# Patient Record
Sex: MALE | Race: WHITE | Employment: OTHER | URBAN - METROPOLITAN AREA
[De-identification: names, ages, dates, MRNs, and addresses within clinical notes are randomized per-mention and may not be internally consistent; named-entity substitution may affect disease eponyms.]

---

## 2017-11-02 ENCOUNTER — OFFICE VISIT (OUTPATIENT)
Dept: FAMILY MEDICINE CLINIC | Age: 67
End: 2017-11-02
Payer: COMMERCIAL

## 2017-11-02 VITALS
WEIGHT: 235 LBS | BODY MASS INDEX: 32.9 KG/M2 | SYSTOLIC BLOOD PRESSURE: 130 MMHG | TEMPERATURE: 97.2 F | RESPIRATION RATE: 18 BRPM | DIASTOLIC BLOOD PRESSURE: 82 MMHG | HEIGHT: 71 IN | HEART RATE: 72 BPM

## 2017-11-02 DIAGNOSIS — B96.89 ACUTE BACTERIAL SINUSITIS: Primary | ICD-10-CM

## 2017-11-02 DIAGNOSIS — J01.90 ACUTE BACTERIAL SINUSITIS: Primary | ICD-10-CM

## 2017-11-02 PROCEDURE — 99214 OFFICE O/P EST MOD 30 MIN: CPT | Performed by: NURSE PRACTITIONER

## 2017-11-02 RX ORDER — ATORVASTATIN CALCIUM 20 MG/1
20 TABLET, FILM COATED ORAL DAILY
Refills: 1 | COMMUNITY
Start: 2017-10-26

## 2017-11-02 RX ORDER — AMOXICILLIN AND CLAVULANATE POTASSIUM 875; 125 MG/1; MG/1
1 TABLET, FILM COATED ORAL 2 TIMES DAILY
Qty: 20 TABLET | Refills: 0 | Status: SHIPPED | OUTPATIENT
Start: 2017-11-02 | End: 2017-11-12

## 2017-11-02 RX ORDER — INFLUENZA A VIRUS A/MICHIGAN/45/2015 X-275 (H1N1) ANTIGEN (FORMALDEHYDE INACTIVATED), INFLUENZA A VIRUS A/SINGAPORE/INFIMH-16-0019/2016 IVR-186 (H3N2) ANTIGEN (FORMALDEHYDE INACTIVATED), AND INFLUENZA B VIRUS B/MARYLAND/15/2016 BX-69A (A B/COLORADO/6/2017-LIKE VIRUS) ANTIGEN (FORMALDEHYDE INACTIVATED) 60; 60; 60 UG/.5ML; UG/.5ML; UG/.5ML
INJECTION, SUSPENSION INTRAMUSCULAR
Refills: 0 | COMMUNITY
Start: 2017-10-10 | End: 2018-10-27

## 2017-11-02 ASSESSMENT — ENCOUNTER SYMPTOMS
EYE REDNESS: 0
CHEST TIGHTNESS: 0
COUGH: 1
EYE DISCHARGE: 0
VOICE CHANGE: 0
SHORTNESS OF BREATH: 0
WHEEZING: 0
SINUS PRESSURE: 0
SORE THROAT: 1

## 2017-11-02 NOTE — PATIENT INSTRUCTIONS

## 2017-11-02 NOTE — PROGRESS NOTES
[address] [address]  Dept: [phone]  Dept Fax: [phone]    Valentin Chaudhary is a 74 y.o. male who presents to the urgent care today for his medical conditions/complaints as noted below. Valentin Chaudhary is c/o of Cough (using mucinex for symtoms ) and Chest Congestion    HPI:     Cough   This is a new problem. Episode onset: 3-4 days ago. The problem has been gradually worsening. The cough is productive of sputum. Associated symptoms include nasal congestion, postnasal drip and a sore throat. Pertinent negatives include no chest pain, chills, ear pain, eye redness, fever, headaches, myalgias, rash, shortness of breath or wheezing. Treatments tried: OTC cough syrup. The treatment provided no relief. There is no history of asthma or COPD. History reviewed. No pertinent past medical history. Current Outpatient Prescriptions   Medication Sig Dispense Refill    atorvastatin (LIPITOR) 20 MG tablet TK 1 T PO QD  1    amoxicillin-clavulanate (AUGMENTIN) 875-125 MG per tablet Take 1 tablet by mouth 2 times daily for 10 days 20 tablet 0    FLUZONE HIGH-DOSE 0.5 ML CHRISTINA injection ADM 0.5ML IM UTD  0    aspirin 81 MG tablet Take 81 mg by mouth 2 times daily      OMEPRAZOLE PO Take 1 tablet by mouth daily       No current facility-administered medications for this visit. No Known Allergies    Reviewed PMH, SH, and FH with the patient and updated. Subjective:      Review of Systems   Constitutional: Negative for chills, fatigue and fever. HENT: Positive for postnasal drip and sore throat. Negative for congestion, ear discharge, ear pain, sinus pressure, sneezing and voice change. Eyes: Negative for discharge and redness. Respiratory: Positive for cough. Negative for chest tightness, shortness of breath and wheezing. Cardiovascular: Negative. Negative for chest pain.    Musculoskeletal: Negative for Dispense:  20 tablet     Refill:  0       Patient given educational materials - see patient instructions. Discussed use, benefit, and side effects of prescribed medications. All patient questions answered. Pt voiced understanding.     Electronically signed by Zenon Barber NP on 11/2/2017 at 12:46 PM

## 2018-04-13 ENCOUNTER — OFFICE VISIT (OUTPATIENT)
Dept: FAMILY MEDICINE CLINIC | Age: 68
End: 2018-04-13
Payer: COMMERCIAL

## 2018-04-13 VITALS
WEIGHT: 232.6 LBS | HEIGHT: 71 IN | BODY MASS INDEX: 32.56 KG/M2 | DIASTOLIC BLOOD PRESSURE: 88 MMHG | TEMPERATURE: 98.6 F | HEART RATE: 68 BPM | SYSTOLIC BLOOD PRESSURE: 136 MMHG | OXYGEN SATURATION: 97 %

## 2018-04-13 DIAGNOSIS — B96.89 ACUTE BACTERIAL SINUSITIS: Primary | ICD-10-CM

## 2018-04-13 DIAGNOSIS — J01.90 ACUTE BACTERIAL SINUSITIS: Primary | ICD-10-CM

## 2018-04-13 PROCEDURE — 1123F ACP DISCUSS/DSCN MKR DOCD: CPT | Performed by: NURSE PRACTITIONER

## 2018-04-13 PROCEDURE — 3017F COLORECTAL CA SCREEN DOC REV: CPT | Performed by: NURSE PRACTITIONER

## 2018-04-13 PROCEDURE — G8427 DOCREV CUR MEDS BY ELIG CLIN: HCPCS | Performed by: NURSE PRACTITIONER

## 2018-04-13 PROCEDURE — 99214 OFFICE O/P EST MOD 30 MIN: CPT | Performed by: NURSE PRACTITIONER

## 2018-04-13 PROCEDURE — 4040F PNEUMOC VAC/ADMIN/RCVD: CPT | Performed by: NURSE PRACTITIONER

## 2018-04-13 PROCEDURE — G8417 CALC BMI ABV UP PARAM F/U: HCPCS | Performed by: NURSE PRACTITIONER

## 2018-04-13 PROCEDURE — 1036F TOBACCO NON-USER: CPT | Performed by: NURSE PRACTITIONER

## 2018-04-13 RX ORDER — AMOXICILLIN AND CLAVULANATE POTASSIUM 875; 125 MG/1; MG/1
1 TABLET, FILM COATED ORAL 2 TIMES DAILY
Qty: 20 TABLET | Refills: 0 | Status: SHIPPED | OUTPATIENT
Start: 2018-04-13 | End: 2018-04-23

## 2018-04-13 RX ORDER — FLUTICASONE PROPIONATE 50 MCG
2 SPRAY, SUSPENSION (ML) NASAL DAILY
Qty: 1 BOTTLE | Refills: 0 | Status: ON HOLD | OUTPATIENT
Start: 2018-04-13 | End: 2020-10-19

## 2018-04-13 ASSESSMENT — ENCOUNTER SYMPTOMS
RHINORRHEA: 1
WHEEZING: 0
VOICE CHANGE: 0
EYE REDNESS: 0
COUGH: 1
SHORTNESS OF BREATH: 0
CHEST TIGHTNESS: 0
SINUS PRESSURE: 1
SORE THROAT: 0
EYE DISCHARGE: 0

## 2018-04-13 ASSESSMENT — PATIENT HEALTH QUESTIONNAIRE - PHQ9
SUM OF ALL RESPONSES TO PHQ9 QUESTIONS 1 & 2: 0
SUM OF ALL RESPONSES TO PHQ QUESTIONS 1-9: 0
1. LITTLE INTEREST OR PLEASURE IN DOING THINGS: 0
2. FEELING DOWN, DEPRESSED OR HOPELESS: 0

## 2018-10-27 ENCOUNTER — OFFICE VISIT (OUTPATIENT)
Dept: FAMILY MEDICINE CLINIC | Age: 68
End: 2018-10-27
Payer: COMMERCIAL

## 2018-10-27 VITALS
OXYGEN SATURATION: 95 % | HEIGHT: 71 IN | SYSTOLIC BLOOD PRESSURE: 132 MMHG | RESPIRATION RATE: 20 BRPM | WEIGHT: 242.2 LBS | DIASTOLIC BLOOD PRESSURE: 82 MMHG | BODY MASS INDEX: 33.91 KG/M2 | HEART RATE: 72 BPM | TEMPERATURE: 97.9 F

## 2018-10-27 DIAGNOSIS — S83.421A SPRAIN OF LATERAL COLLATERAL LIGAMENT OF RIGHT KNEE, INITIAL ENCOUNTER: Primary | ICD-10-CM

## 2018-10-27 PROCEDURE — 1101F PT FALLS ASSESS-DOCD LE1/YR: CPT | Performed by: INTERNAL MEDICINE

## 2018-10-27 PROCEDURE — 4040F PNEUMOC VAC/ADMIN/RCVD: CPT | Performed by: INTERNAL MEDICINE

## 2018-10-27 PROCEDURE — 1123F ACP DISCUSS/DSCN MKR DOCD: CPT | Performed by: INTERNAL MEDICINE

## 2018-10-27 PROCEDURE — 3017F COLORECTAL CA SCREEN DOC REV: CPT | Performed by: INTERNAL MEDICINE

## 2018-10-27 PROCEDURE — 1036F TOBACCO NON-USER: CPT | Performed by: INTERNAL MEDICINE

## 2018-10-27 PROCEDURE — G8417 CALC BMI ABV UP PARAM F/U: HCPCS | Performed by: INTERNAL MEDICINE

## 2018-10-27 PROCEDURE — G8484 FLU IMMUNIZE NO ADMIN: HCPCS | Performed by: INTERNAL MEDICINE

## 2018-10-27 PROCEDURE — 99213 OFFICE O/P EST LOW 20 MIN: CPT | Performed by: INTERNAL MEDICINE

## 2018-10-27 PROCEDURE — G8427 DOCREV CUR MEDS BY ELIG CLIN: HCPCS | Performed by: INTERNAL MEDICINE

## 2018-10-27 RX ORDER — IBUPROFEN 800 MG/1
800 TABLET ORAL EVERY 8 HOURS PRN
Qty: 42 TABLET | Refills: 0 | Status: SHIPPED | OUTPATIENT
Start: 2018-10-27 | End: 2019-11-15

## 2018-10-27 NOTE — PROGRESS NOTES
[facility]  [address]  [facility] [address]  Dept: [phone]  Dept Fax: [phone]    Kiki Matute a 79 y.o. male who presents to the urgent care today for his medical conditions/complaintsas noted below. Ruben Eli is c/o of Knee Pain (left knee. Pt was walking on tuesday and he felt his knee snap and now it is stiff and swollen )      HPI:     Knee Pain    The incident occurred 5 to 7 days ago. There was no injury mechanism. The pain is present in the right knee. The quality of the pain is described as shooting. The pain is moderate. The pain has been improving since onset. Pertinent negatives include no inability to bear weight, loss of motion, loss of sensation, muscle weakness, numbness or tingling. The symptoms are aggravated by movement (walking). He has tried non-weight bearing, rest, NSAIDs and ice (ibuprofen 400mg TID ) for the symptoms. The treatment provided significant relief. No past medical history on file. Current Outpatient Prescriptions   Medication Sig Dispense Refill    fluticasone (FLONASE) 50 MCG/ACT nasal spray 2 sprays by Nasal route daily 1 Bottle 0    atorvastatin (LIPITOR) 20 MG tablet TK 1 T PO QD  1    aspirin 81 MG tablet Take 81 mg by mouth 2 times daily      OMEPRAZOLE PO Take 1 tablet by mouth daily       No current facility-administered medications for this visit. No Known Allergies    Subjective:     Review of Systems   Neurological: Negative for tingling and numbness. All other systems reviewed and are negative. Objective:     Physical Exam   Constitutional: He appears well-developed and well-nourished. Musculoskeletal:        Right knee: He exhibits normal range of motion, no swelling, no effusion, no ecchymosis, no deformity, no laceration, no erythema, normal alignment, no LCL laxity, normal patellar mobility, no bony tenderness, normal meniscus and no MCL laxity.

## 2018-10-27 NOTE — PATIENT INSTRUCTIONS
seconds, then rest for up to 10 seconds. 7. Repeat 8 to 12 times. Lateral step-up    1. Stand sideways on the bottom step of a staircase with your injured leg on the step and your other foot on the floor. Hold on to the banister or wall. 2. Use your injured leg to raise yourself up, bringing your other foot level with the stair step. Make sure to keep your hips level as you do this. And try to keep your knee moving in a straight line with your middle toe. Do not put the foot you are raising on the stair step. 3. Slowly lower your foot back down. 4. Repeat 8 to 12 times. Wall squats with ball    You will need a large therapy ball for this exercise. Ask your doctor or physical therapist what size you will need, but it should be large enough to cover your back. 1. Stand with your back facing a wall. Place your feet about a shoulder-width apart. 2. Place the therapy ball between your back and the wall, and move your feet out in front of you so they are about a foot in front of your hips. 3. Keep your arms at your sides, or put your hands on your hips. 4. Slowly squat down as if you are going to sit in a chair, rolling your back over the ball as you squat. The ball should move with you but stay pressed into the wall. 5. Be sure that your knees do not go in front of your toes as you squat. 6. Hold for 6 seconds. 7. Slowly rise to your standing position. 8. Repeat 8 to 12 times. Follow-up care is a key part of your treatment and safety. Be sure to make and go to all appointments, and call your doctor if you are having problems. It's also a good idea to know your test results and keep a list of the medicines you take. Where can you learn more? Go to https://[email or web address]. org and sign in to your [email or web address] account. Enter A255 in the Arradiance box to learn more about \"Lateral Collateral Ligament Sprain: Rehab Exercises. \"     If you do not have an account, please click on the \"Sign

## 2018-11-13 ENCOUNTER — NURSE ONLY (OUTPATIENT)
Dept: FAMILY MEDICINE CLINIC | Age: 68
End: 2018-11-13
Payer: COMMERCIAL

## 2018-11-13 DIAGNOSIS — Z23 IMMUNIZATION DUE: Primary | ICD-10-CM

## 2018-11-13 PROCEDURE — 90471 IMMUNIZATION ADMIN: CPT | Performed by: NURSE PRACTITIONER

## 2018-11-13 PROCEDURE — 90715 TDAP VACCINE 7 YRS/> IM: CPT | Performed by: NURSE PRACTITIONER

## 2018-11-13 NOTE — PROGRESS NOTES
After obtaining consent, and per orders of Chacha Hart CNP, injection of Boostrix given in Left deltoid by Yaniv Vaughn. Patient instructed to remain in clinic for 20 minutes afterwards, and to report any adverse reaction to me immediately.

## 2019-02-09 ENCOUNTER — OFFICE VISIT (OUTPATIENT)
Dept: FAMILY MEDICINE CLINIC | Age: 69
End: 2019-02-09
Payer: COMMERCIAL

## 2019-02-09 VITALS
RESPIRATION RATE: 18 BRPM | OXYGEN SATURATION: 98 % | HEART RATE: 63 BPM | SYSTOLIC BLOOD PRESSURE: 132 MMHG | BODY MASS INDEX: 34.72 KG/M2 | TEMPERATURE: 98.2 F | WEIGHT: 248 LBS | DIASTOLIC BLOOD PRESSURE: 70 MMHG | HEIGHT: 71 IN

## 2019-02-09 DIAGNOSIS — J01.90 ACUTE BACTERIAL SINUSITIS: Primary | ICD-10-CM

## 2019-02-09 DIAGNOSIS — B96.89 ACUTE BACTERIAL SINUSITIS: Primary | ICD-10-CM

## 2019-02-09 PROCEDURE — 1123F ACP DISCUSS/DSCN MKR DOCD: CPT | Performed by: INTERNAL MEDICINE

## 2019-02-09 PROCEDURE — G8417 CALC BMI ABV UP PARAM F/U: HCPCS | Performed by: INTERNAL MEDICINE

## 2019-02-09 PROCEDURE — 1036F TOBACCO NON-USER: CPT | Performed by: INTERNAL MEDICINE

## 2019-02-09 PROCEDURE — G8484 FLU IMMUNIZE NO ADMIN: HCPCS | Performed by: INTERNAL MEDICINE

## 2019-02-09 PROCEDURE — 4040F PNEUMOC VAC/ADMIN/RCVD: CPT | Performed by: INTERNAL MEDICINE

## 2019-02-09 PROCEDURE — G8427 DOCREV CUR MEDS BY ELIG CLIN: HCPCS | Performed by: INTERNAL MEDICINE

## 2019-02-09 PROCEDURE — 99213 OFFICE O/P EST LOW 20 MIN: CPT | Performed by: INTERNAL MEDICINE

## 2019-02-09 PROCEDURE — 1101F PT FALLS ASSESS-DOCD LE1/YR: CPT | Performed by: INTERNAL MEDICINE

## 2019-02-09 PROCEDURE — 3017F COLORECTAL CA SCREEN DOC REV: CPT | Performed by: INTERNAL MEDICINE

## 2019-02-09 RX ORDER — OXYMETAZOLINE HYDROCHLORIDE 0.05 G/100ML
1 SPRAY NASAL 2 TIMES DAILY
Qty: 1 BOTTLE | Refills: 0 | Status: SHIPPED | OUTPATIENT
Start: 2019-02-09 | End: 2019-02-12

## 2019-02-09 RX ORDER — AMOXICILLIN 875 MG/1
875 TABLET, COATED ORAL 2 TIMES DAILY
Qty: 14 TABLET | Refills: 0 | Status: SHIPPED | OUTPATIENT
Start: 2019-02-09 | End: 2019-02-16

## 2019-02-09 ASSESSMENT — ENCOUNTER SYMPTOMS
SHORTNESS OF BREATH: 1
SORE THROAT: 1
COUGH: 1
SWOLLEN GLANDS: 0
SINUS PRESSURE: 1
HOARSE VOICE: 0

## 2019-11-15 ENCOUNTER — OFFICE VISIT (OUTPATIENT)
Dept: FAMILY MEDICINE CLINIC | Age: 69
End: 2019-11-15
Payer: COMMERCIAL

## 2019-11-15 VITALS
HEART RATE: 60 BPM | TEMPERATURE: 98.1 F | SYSTOLIC BLOOD PRESSURE: 148 MMHG | OXYGEN SATURATION: 96 % | DIASTOLIC BLOOD PRESSURE: 82 MMHG

## 2019-11-15 DIAGNOSIS — H61.22 IMPACTED CERUMEN OF LEFT EAR: Primary | ICD-10-CM

## 2019-11-15 PROCEDURE — 69209 REMOVE IMPACTED EAR WAX UNI: CPT | Performed by: NURSE PRACTITIONER

## 2019-11-15 PROCEDURE — 99213 OFFICE O/P EST LOW 20 MIN: CPT | Performed by: NURSE PRACTITIONER

## 2019-11-15 ASSESSMENT — ENCOUNTER SYMPTOMS
SORE THROAT: 0
COUGH: 0
RHINORRHEA: 0

## 2020-01-28 ENCOUNTER — APPOINTMENT (OUTPATIENT)
Dept: CT IMAGING | Age: 70
DRG: 310 | End: 2020-01-28
Payer: COMMERCIAL

## 2020-01-28 ENCOUNTER — HOSPITAL ENCOUNTER (INPATIENT)
Age: 70
LOS: 1 days | Discharge: HOME | DRG: 310 | End: 2020-01-29
Attending: EMERGENCY MEDICINE | Admitting: FAMILY MEDICINE
Payer: COMMERCIAL

## 2020-01-28 ENCOUNTER — APPOINTMENT (OUTPATIENT)
Dept: GENERAL RADIOLOGY | Age: 70
DRG: 310 | End: 2020-01-28
Payer: COMMERCIAL

## 2020-01-28 PROBLEM — I48.92 ATRIAL FIBRILLATION/FLUTTER (HCC): Status: ACTIVE | Noted: 2020-01-28

## 2020-01-28 PROBLEM — I48.91 ATRIAL FIBRILLATION/FLUTTER (HCC): Status: ACTIVE | Noted: 2020-01-28

## 2020-01-28 LAB
% CKMB: 2.1 % (ref 0–3.5)
ABSOLUTE EOS #: 0.12 K/UL (ref 0–0.44)
ABSOLUTE IMMATURE GRANULOCYTE: 0.01 K/UL (ref 0–0.3)
ABSOLUTE LYMPH #: 2.37 K/UL (ref 1.1–3.7)
ABSOLUTE MONO #: 0.6 K/UL (ref 0.1–1.2)
ANION GAP SERPL CALCULATED.3IONS-SCNC: 15 MMOL/L (ref 9–17)
BASOPHILS # BLD: 1 % (ref 0–2)
BASOPHILS ABSOLUTE: 0.03 K/UL (ref 0–0.2)
BNP INTERPRETATION: NORMAL
BUN BLDV-MCNC: 18 MG/DL (ref 8–23)
BUN/CREAT BLD: 18 (ref 9–20)
CALCIUM SERPL-MCNC: 9.8 MG/DL (ref 8.6–10.4)
CHLORIDE BLD-SCNC: 100 MMOL/L (ref 98–107)
CK MB: 4.1 NG/ML
CKMB INTERPRETATION: NORMAL
CO2: 25 MMOL/L (ref 20–31)
CREAT SERPL-MCNC: 0.98 MG/DL (ref 0.7–1.2)
D-DIMER QUANTITATIVE: 0.7 MG/L FEU
DIFFERENTIAL TYPE: ABNORMAL
EKG ATRIAL RATE: 278 BPM
EKG Q-T INTERVAL: 370 MS
EKG QRS DURATION: 92 MS
EKG QTC CALCULATION (BAZETT): 535 MS
EKG R AXIS: 2 DEGREES
EKG T AXIS: -47 DEGREES
EKG VENTRICULAR RATE: 126 BPM
EOSINOPHILS RELATIVE PERCENT: 2 % (ref 1–4)
GFR AFRICAN AMERICAN: >60 ML/MIN
GFR NON-AFRICAN AMERICAN: >60 ML/MIN
GFR SERPL CREATININE-BSD FRML MDRD: ABNORMAL ML/MIN/{1.73_M2}
GFR SERPL CREATININE-BSD FRML MDRD: ABNORMAL ML/MIN/{1.73_M2}
GLUCOSE BLD-MCNC: 141 MG/DL (ref 70–99)
HCT VFR BLD CALC: 45.7 % (ref 40.7–50.3)
HEMOGLOBIN: 15.1 G/DL (ref 13–17)
IMMATURE GRANULOCYTES: 0 %
LV EF: 55 %
LVEF MODALITY: NORMAL
LYMPHOCYTES # BLD: 44 % (ref 24–43)
MAGNESIUM: 1.6 MG/DL (ref 1.6–2.6)
MCH RBC QN AUTO: 32.1 PG (ref 25.2–33.5)
MCHC RBC AUTO-ENTMCNC: 33 G/DL (ref 28.4–34.8)
MCV RBC AUTO: 97 FL (ref 82.6–102.9)
MONOCYTES # BLD: 11 % (ref 3–12)
MYOGLOBIN: 33 NG/ML (ref 28–72)
NRBC AUTOMATED: 0 PER 100 WBC
PDW BLD-RTO: 14.1 % (ref 11.8–14.4)
PLATELET # BLD: 164 K/UL (ref 138–453)
PLATELET ESTIMATE: ABNORMAL
PMV BLD AUTO: 10.9 FL (ref 8.1–13.5)
POTASSIUM SERPL-SCNC: 3.9 MMOL/L (ref 3.7–5.3)
PRO-BNP: 118 PG/ML
RBC # BLD: 4.71 M/UL (ref 4.21–5.77)
RBC # BLD: ABNORMAL 10*6/UL
SEDIMENTATION RATE, ERYTHROCYTE: 7 MM (ref 0–15)
SEG NEUTROPHILS: 42 % (ref 36–65)
SEGMENTED NEUTROPHILS ABSOLUTE COUNT: 2.29 K/UL (ref 1.5–8.1)
SODIUM BLD-SCNC: 140 MMOL/L (ref 135–144)
THYROXINE, FREE: 1 NG/DL (ref 0.93–1.7)
TOTAL CK: 194 U/L (ref 39–308)
TROPONIN INTERP: NORMAL
TROPONIN T: NORMAL NG/ML
TROPONIN, HIGH SENSITIVITY: 13 NG/L (ref 0–22)
TROPONIN, HIGH SENSITIVITY: 13 NG/L (ref 0–22)
TROPONIN, HIGH SENSITIVITY: 15 NG/L (ref 0–22)
TROPONIN, HIGH SENSITIVITY: 16 NG/L (ref 0–22)
TSH SERPL DL<=0.05 MIU/L-ACNC: 5.44 MIU/L (ref 0.3–5)
WBC # BLD: 5.4 K/UL (ref 3.5–11.3)
WBC # BLD: ABNORMAL 10*3/UL

## 2020-01-28 PROCEDURE — 2500000003 HC RX 250 WO HCPCS: Performed by: EMERGENCY MEDICINE

## 2020-01-28 PROCEDURE — 6370000000 HC RX 637 (ALT 250 FOR IP): Performed by: FAMILY MEDICINE

## 2020-01-28 PROCEDURE — 71045 X-RAY EXAM CHEST 1 VIEW: CPT

## 2020-01-28 PROCEDURE — 6360000002 HC RX W HCPCS: Performed by: EMERGENCY MEDICINE

## 2020-01-28 PROCEDURE — 96365 THER/PROPH/DIAG IV INF INIT: CPT

## 2020-01-28 PROCEDURE — 96375 TX/PRO/DX INJ NEW DRUG ADDON: CPT

## 2020-01-28 PROCEDURE — 84443 ASSAY THYROID STIM HORMONE: CPT

## 2020-01-28 PROCEDURE — G0378 HOSPITAL OBSERVATION PER HR: HCPCS

## 2020-01-28 PROCEDURE — 83735 ASSAY OF MAGNESIUM: CPT

## 2020-01-28 PROCEDURE — 85651 RBC SED RATE NONAUTOMATED: CPT

## 2020-01-28 PROCEDURE — 96376 TX/PRO/DX INJ SAME DRUG ADON: CPT

## 2020-01-28 PROCEDURE — 93306 TTE W/DOPPLER COMPLETE: CPT

## 2020-01-28 PROCEDURE — 2580000003 HC RX 258: Performed by: EMERGENCY MEDICINE

## 2020-01-28 PROCEDURE — 96366 THER/PROPH/DIAG IV INF ADDON: CPT

## 2020-01-28 PROCEDURE — 93005 ELECTROCARDIOGRAM TRACING: CPT | Performed by: EMERGENCY MEDICINE

## 2020-01-28 PROCEDURE — 84439 ASSAY OF FREE THYROXINE: CPT

## 2020-01-28 PROCEDURE — 83880 ASSAY OF NATRIURETIC PEPTIDE: CPT

## 2020-01-28 PROCEDURE — 99285 EMERGENCY DEPT VISIT HI MDM: CPT

## 2020-01-28 PROCEDURE — 71260 CT THORAX DX C+: CPT

## 2020-01-28 PROCEDURE — 2500000003 HC RX 250 WO HCPCS: Performed by: FAMILY MEDICINE

## 2020-01-28 PROCEDURE — 85379 FIBRIN DEGRADATION QUANT: CPT

## 2020-01-28 PROCEDURE — 2060000000 HC ICU INTERMEDIATE R&B

## 2020-01-28 PROCEDURE — 82550 ASSAY OF CK (CPK): CPT

## 2020-01-28 PROCEDURE — 83874 ASSAY OF MYOGLOBIN: CPT

## 2020-01-28 PROCEDURE — 85025 COMPLETE CBC W/AUTO DIFF WBC: CPT

## 2020-01-28 PROCEDURE — 36415 COLL VENOUS BLD VENIPUNCTURE: CPT

## 2020-01-28 PROCEDURE — 84484 ASSAY OF TROPONIN QUANT: CPT

## 2020-01-28 PROCEDURE — 96372 THER/PROPH/DIAG INJ SC/IM: CPT

## 2020-01-28 PROCEDURE — 6360000004 HC RX CONTRAST MEDICATION: Performed by: EMERGENCY MEDICINE

## 2020-01-28 PROCEDURE — 82553 CREATINE MB FRACTION: CPT

## 2020-01-28 PROCEDURE — 96368 THER/DIAG CONCURRENT INF: CPT

## 2020-01-28 PROCEDURE — 80048 BASIC METABOLIC PNL TOTAL CA: CPT

## 2020-01-28 PROCEDURE — 6370000000 HC RX 637 (ALT 250 FOR IP): Performed by: EMERGENCY MEDICINE

## 2020-01-28 PROCEDURE — 6360000002 HC RX W HCPCS: Performed by: FAMILY MEDICINE

## 2020-01-28 RX ORDER — METOPROLOL TARTRATE 5 MG/5ML
5 INJECTION INTRAVENOUS ONCE
Status: COMPLETED | OUTPATIENT
Start: 2020-01-28 | End: 2020-01-28

## 2020-01-28 RX ORDER — PHENOL 1.4 %
10 AEROSOL, SPRAY (ML) MUCOUS MEMBRANE NIGHTLY
COMMUNITY

## 2020-01-28 RX ORDER — POTASSIUM CHLORIDE 20 MEQ/1
40 TABLET, EXTENDED RELEASE ORAL PRN
Status: DISCONTINUED | OUTPATIENT
Start: 2020-01-28 | End: 2020-01-29 | Stop reason: HOSPADM

## 2020-01-28 RX ORDER — MAGNESIUM SULFATE 1 G/100ML
1 INJECTION INTRAVENOUS PRN
Status: DISCONTINUED | OUTPATIENT
Start: 2020-01-28 | End: 2020-01-29 | Stop reason: HOSPADM

## 2020-01-28 RX ORDER — ONDANSETRON 2 MG/ML
4 INJECTION INTRAMUSCULAR; INTRAVENOUS EVERY 6 HOURS PRN
Status: DISCONTINUED | OUTPATIENT
Start: 2020-01-28 | End: 2020-01-28

## 2020-01-28 RX ORDER — NICOTINE 21 MG/24HR
1 PATCH, TRANSDERMAL 24 HOURS TRANSDERMAL DAILY PRN
Status: DISCONTINUED | OUTPATIENT
Start: 2020-01-28 | End: 2020-01-28

## 2020-01-28 RX ORDER — SODIUM CHLORIDE 0.9 % (FLUSH) 0.9 %
10 SYRINGE (ML) INJECTION PRN
Status: DISCONTINUED | OUTPATIENT
Start: 2020-01-28 | End: 2020-01-28 | Stop reason: SDUPTHER

## 2020-01-28 RX ORDER — ACETAMINOPHEN 325 MG/1
650 TABLET ORAL EVERY 4 HOURS PRN
Status: DISCONTINUED | OUTPATIENT
Start: 2020-01-28 | End: 2020-01-29 | Stop reason: HOSPADM

## 2020-01-28 RX ORDER — MAGNESIUM SULFATE 1 G/100ML
1 INJECTION INTRAVENOUS ONCE
Status: COMPLETED | OUTPATIENT
Start: 2020-01-28 | End: 2020-01-28

## 2020-01-28 RX ORDER — SODIUM CHLORIDE 0.9 % (FLUSH) 0.9 %
10 SYRINGE (ML) INJECTION EVERY 12 HOURS SCHEDULED
Status: DISCONTINUED | OUTPATIENT
Start: 2020-01-28 | End: 2020-01-29 | Stop reason: HOSPADM

## 2020-01-28 RX ORDER — DOCUSATE SODIUM 100 MG/1
100 CAPSULE, LIQUID FILLED ORAL DAILY
Status: ON HOLD | COMMUNITY
End: 2020-10-18

## 2020-01-28 RX ORDER — SODIUM CHLORIDE 0.9 % (FLUSH) 0.9 %
10 SYRINGE (ML) INJECTION PRN
Status: DISCONTINUED | OUTPATIENT
Start: 2020-01-28 | End: 2020-01-29 | Stop reason: HOSPADM

## 2020-01-28 RX ORDER — SODIUM CHLORIDE 9 MG/ML
INJECTION, SOLUTION INTRAVENOUS CONTINUOUS
Status: DISCONTINUED | OUTPATIENT
Start: 2020-01-28 | End: 2020-01-29 | Stop reason: HOSPADM

## 2020-01-28 RX ORDER — ASPIRIN 81 MG/1
81 TABLET ORAL DAILY
Status: DISCONTINUED | OUTPATIENT
Start: 2020-01-28 | End: 2020-01-29 | Stop reason: HOSPADM

## 2020-01-28 RX ORDER — 0.9 % SODIUM CHLORIDE 0.9 %
80 INTRAVENOUS SOLUTION INTRAVENOUS ONCE
Status: COMPLETED | OUTPATIENT
Start: 2020-01-28 | End: 2020-01-28

## 2020-01-28 RX ORDER — M-VIT,TX,IRON,MINS/CALC/FOLIC 27MG-0.4MG
1 TABLET ORAL DAILY
COMMUNITY

## 2020-01-28 RX ORDER — ASPIRIN 81 MG/1
324 TABLET, CHEWABLE ORAL ONCE
Status: COMPLETED | OUTPATIENT
Start: 2020-01-28 | End: 2020-01-28

## 2020-01-28 RX ORDER — METOPROLOL TARTRATE 5 MG/5ML
5 INJECTION INTRAVENOUS ONCE
Status: DISCONTINUED | OUTPATIENT
Start: 2020-01-28 | End: 2020-01-28

## 2020-01-28 RX ORDER — ATORVASTATIN CALCIUM 20 MG/1
20 TABLET, FILM COATED ORAL NIGHTLY
Status: DISCONTINUED | OUTPATIENT
Start: 2020-01-28 | End: 2020-01-29 | Stop reason: HOSPADM

## 2020-01-28 RX ORDER — POTASSIUM CHLORIDE 7.45 MG/ML
10 INJECTION INTRAVENOUS PRN
Status: DISCONTINUED | OUTPATIENT
Start: 2020-01-28 | End: 2020-01-29 | Stop reason: HOSPADM

## 2020-01-28 RX ORDER — SODIUM CHLORIDE 0.9 % (FLUSH) 0.9 %
10 SYRINGE (ML) INJECTION EVERY 12 HOURS SCHEDULED
Status: DISCONTINUED | OUTPATIENT
Start: 2020-01-28 | End: 2020-01-28 | Stop reason: SDUPTHER

## 2020-01-28 RX ORDER — DILTIAZEM HYDROCHLORIDE 5 MG/ML
10 INJECTION INTRAVENOUS ONCE
Status: COMPLETED | OUTPATIENT
Start: 2020-01-28 | End: 2020-01-28

## 2020-01-28 RX ORDER — ONDANSETRON 4 MG/1
4 TABLET, ORALLY DISINTEGRATING ORAL EVERY 6 HOURS PRN
Status: DISCONTINUED | OUTPATIENT
Start: 2020-01-28 | End: 2020-01-29 | Stop reason: HOSPADM

## 2020-01-28 RX ORDER — ONDANSETRON 2 MG/ML
4 INJECTION INTRAMUSCULAR; INTRAVENOUS EVERY 6 HOURS PRN
Status: DISCONTINUED | OUTPATIENT
Start: 2020-01-28 | End: 2020-01-29 | Stop reason: HOSPADM

## 2020-01-28 RX ADMIN — MAGNESIUM SULFATE HEPTAHYDRATE 1 G: 1 INJECTION, SOLUTION INTRAVENOUS at 05:05

## 2020-01-28 RX ADMIN — DILTIAZEM HYDROCHLORIDE 10 MG: 5 INJECTION INTRAVENOUS at 05:05

## 2020-01-28 RX ADMIN — ENOXAPARIN SODIUM 40 MG: 40 INJECTION SUBCUTANEOUS at 09:28

## 2020-01-28 RX ADMIN — SODIUM CHLORIDE 80 ML: 9 INJECTION, SOLUTION INTRAVENOUS at 05:24

## 2020-01-28 RX ADMIN — SODIUM CHLORIDE: 9 INJECTION, SOLUTION INTRAVENOUS at 23:36

## 2020-01-28 RX ADMIN — IOPAMIDOL 75 ML: 755 INJECTION, SOLUTION INTRAVENOUS at 05:23

## 2020-01-28 RX ADMIN — DILTIAZEM HYDROCHLORIDE 5 MG/HR: 5 INJECTION INTRAVENOUS at 05:36

## 2020-01-28 RX ADMIN — Medication 10 ML: at 05:24

## 2020-01-28 RX ADMIN — SODIUM CHLORIDE: 9 INJECTION, SOLUTION INTRAVENOUS at 04:10

## 2020-01-28 RX ADMIN — ASPIRIN 81 MG 324 MG: 81 TABLET ORAL at 04:10

## 2020-01-28 RX ADMIN — METOPROLOL TARTRATE 5 MG: 5 INJECTION INTRAVENOUS at 04:11

## 2020-01-28 RX ADMIN — ATORVASTATIN CALCIUM 20 MG: 20 TABLET, FILM COATED ORAL at 22:15

## 2020-01-28 RX ADMIN — METOPROLOL TARTRATE 5 MG: 5 INJECTION INTRAVENOUS at 04:19

## 2020-01-28 RX ADMIN — FAMOTIDINE 20 MG: 10 INJECTION, SOLUTION INTRAVENOUS at 04:11

## 2020-01-28 RX ADMIN — DILTIAZEM HYDROCHLORIDE 5 MG/HR: 5 INJECTION INTRAVENOUS at 21:25

## 2020-01-28 RX ADMIN — FAMOTIDINE 20 MG: 10 INJECTION, SOLUTION INTRAVENOUS at 22:15

## 2020-01-28 ASSESSMENT — PATIENT HEALTH QUESTIONNAIRE - PHQ9: SUM OF ALL RESPONSES TO PHQ QUESTIONS 1-9: 6

## 2020-01-28 ASSESSMENT — PAIN SCALES - GENERAL: PAINLEVEL_OUTOF10: 0

## 2020-01-28 NOTE — ED PROVIDER NOTES
[address] [place] [address] ED  eMERGENCY dEPARTMENT eNCOUnter      Pt Name: Angelique Jones  MRN: [id number]  [place] 1950  Date of evaluation: 1/28/2020  Provider: Gabrielle Hui MD    CHIEF COMPLAINT       Chief Complaint   Patient presents with    Tachycardia         HISTORY OF PRESENT ILLNESS  (Location/Symptom, Timing/Onset, Context/Setting, Quality, Duration, Modifying Factors, Severity.)   Angelique Jones is a 81 y.o. male who presents to the emergency department via EMS due to sensation of his heart racing. Patient states he was fine when he went to bed. He woke this morning and his heart was pounding. He states he felt forceful and rapid. He used a home monitor and found his heart rate to be 160. He called paramedics on their arrival heart rate sinus tach in the 130s. He was transported here he is 130s sinus tach versus a flutter on arrival.  Patient denies any previous cardiac history. He is also markedly hypertensive. He denies being on hypertensive medications. He denies chest pain or pressure. He states he just has the palpitations. He has no shortness of breath. No complaint of headache nausea      Nursing Notes were reviewed. ALLERGIES     Patient has no known allergies. CURRENT MEDICATIONS       Previous Medications    ASPIRIN 81 MG TABLET    Take 81 mg by mouth daily     ATORVASTATIN (LIPITOR) 20 MG TABLET    TK 1 T PO QD    FLUTICASONE (FLONASE) 50 MCG/ACT NASAL SPRAY    2 sprays by Nasal route daily    OMEPRAZOLE PO    Take 1 tablet by mouth daily       PAST MEDICAL HISTORY         Diagnosis Date    Hyperlipidemia        SURGICAL HISTORY           Procedure Laterality Date    APPENDECTOMY      HERNIA REPAIR      NASAL SEPTUM SURGERY      TONSILLECTOMY           FAMILY HISTORY     History reviewed. No pertinent family history. No family status information on file. SOCIAL HISTORY      reports that he is a non-smoker but has been exposed to tobacco smoke.  He has never used smokeless tobacco. He reports current alcohol use. He reports that he does not use drugs. REVIEW OF SYSTEMS    (2-9 systems for level 4, 10 or more for level 5)     Review of Systems   All other systems reviewed and are negative. Except as noted above the remainder of the review of systems was reviewed and negative. PHYSICAL EXAM    (up to 7 for level 4, 8 or more for level 5)     Vitals:    01/28/20 0404 01/28/20 0429 01/28/20 0510 01/28/20 0551   BP: (!) 190/115 (!) 152/106  (!) 139/101   Pulse: 127 118 119 116   Resp: 23 21 12 26   Temp: 98.1 °F (36.7 °C)      SpO2: 94% 95% 94% 91%   Weight: 224 lb (101.6 kg)      Height: 5' 11\" (1.803 m)          Physical exam reflects a well-nourished well-hydrated male. He is hypertensive tachycardic and tachypneic. He is afebrile pulse ox 94% on room air. He is not hypoxic. He is alert and conversive. Integument warm and dry. Oral pharyngeal exam without lesion. No difficulty breathing speaking or swallowing. Heart tachycardic. Monitor demonstrates what appears to be a flutter with 2-1 conduction versus sinus tach. Abdomen is soft throughout no focal pain. Lungs are clear to auscultation throughout no wheezes rales rhonchi. Extremities show no cyanosis clubbing or edema. No calf swelling erythema or asymmetry. He has no acute neurovascular deficits      DIAGNOSTIC RESULTS     EKG: All EKG's are interpreted by the Emergency Department Physician who either signs or Co-signs this chart in the absence of a cardiologist.    EKG tracing more consistent with a flutter rate in the 120s. Nonspecific ST changes noted more pronounced inferiorly. Ischemia not excluded.     RADIOLOGY:   Non-plain film images such as CT, Ultrasound and MRI are read by the radiologist. Plain radiographic images are visualized and preliminarily interpreted by the emergency physician with the below findings:        Interpretation per the Radiologist below, if available at the time of this note:    CT Chest Pulmonary Embolism W Contrast   Final Result   1. No evidence of acute pulmonary embolism. 2. Moderate cardiomegaly. XR CHEST PORTABLE   Final Result   Mild cardiomegaly. Otherwise, unremarkable single AP portable view of the chest.               LABS:  Labs Reviewed   BASIC METABOLIC PANEL - Abnormal; Notable for the following components:       Result Value    Glucose 141 (*)     All other components within normal limits   CBC WITH AUTO DIFFERENTIAL - Abnormal; Notable for the following components:    Lymphocytes 44 (*)     All other components within normal limits   TSH WITHOUT REFLEX - Abnormal; Notable for the following components:    TSH 5.44 (*)     All other components within normal limits   BRAIN NATRIURETIC PEPTIDE   D-DIMER, QUANTITATIVE   MAGNESIUM   T4, FREE   TROP/MYOGLOBIN   CK ISOENZYMES   SEDIMENTATION RATE       All other labs were within normal range or not returned as of this dictation. EMERGENCY DEPARTMENT COURSE and DIFFERENTIAL DIAGNOSIS/MDM:   Vitals:    Vitals:    01/28/20 0404 01/28/20 0429 01/28/20 0510 01/28/20 0551   BP: (!) 190/115 (!) 152/106  (!) 139/101   Pulse: 127 118 119 116   Resp: 23 21 12 26   Temp: 98.1 °F (36.7 °C)      SpO2: 94% 95% 94% 91%   Weight: 224 lb (101.6 kg)      Height: 5' 11\" (1.803 m)        Patient is evaluated on arrival.  He is hypertensive and tachycardic on arrival.  He is initially treated with IV Lopressor. He did have improvement in pressures and mild improvement in heart rate. Telemetry monitor demonstrated tracing more consistent with A. fib flutter with RVR. He is changed over to Cardizem therapy at this point. Laboratory studies reflect mild hypomagnesemia which is supplemented. D-dimer elevated CT will be reviewed. CT returns benign. Care is discussed with internal medicine to facilitate admission. Cardiology is paged with regard to consultation on additional recommendations.     CONSULTS:  ARMANDO CONSULT TO INTERNAL MEDICINE  IP CONSULT TO CARDIOLOGY    PROCEDURES:  None    FINAL IMPRESSION      1. Atrial fibrillation/flutter ([address]    2. Essential hypertension          DISPOSITION/PLAN   DISPOSITION    Decision to Admit    PATIENT REFERRED TO:   No follow-up provider specified.     DISCHARGE MEDICATIONS:     New Prescriptions    No medications on file         (Please note that portions of this note were completed with a voice recognition program.  Efforts were made to edit the dictations but occasionally words are mis-transcribed.)    Francie Sandy MD  Attending Emergency Physician          Francie Sandy MD  01/28/20 5518

## 2020-01-28 NOTE — H&P
History & Physical  [facility]    Adult Hospitalist      Name: Little Ramirez  MRN: [id number]     Acct: [de-identified]  Room: [facility]/    Admit Date: 1/28/2020  4:00 AM  PCP: Triston Manzano MD    Primary Problem  Active Problems:    Atrial fibrillation/flutter [facility])  Resolved Problems:    * No resolved hospital problems. *        Assesment:     · Atrial fibrillation with RVR/flutter  · History of aortic and mitral regurgitation  · History of paroxysmal atrial tachycardia  · Hyperlipidemia   · Essential hypertension  · Gastroesophageal reflux disease          Plan:     · Admit patient to intermediate floor  · Oxygen keep SpO2 more than 90%   · Telemetry  · Check vital closely   · CBC BMP daily  · TSH free T4  · Echocardiogram  · IV Cardizem   · Subcutaneous Lovenox for anticoagulation for now, further anticoagulation as per Cardiology   · Pepcid   · Continue atorvastatin And aspirin  · Cardiology consult  · Continue to monitor  · Further recommendation to follow  · DVT and GI prophylaxis.         Chief Complaint:     Chief Complaint   Patient presents with    Tachycardia         History of Present Illness:      Little Ramirez is a 81 y.o.  male who presents with Tachycardia  This 43-year-old  [place] was been admitted by emergency department, patient came to the ER with complaint of having palpitations/tachycardia, patient tells me that he was actually in his bed started noticing some pounding of his heart on his left side, checked his pulse and found that his pulse was very rapid, called EMS, further was noticed in the emergency room to be in atrial fibrillation with RVR/flutter, patient tells me he does not have any history of Afib in the past, he does follow up with Cardiology for mitral and aortic regurgitation, tells me had a stress test last year was negative, also see has a history of paroxysmal atrial tachycardia in the past, patient admitted for further management    I have personally reviewed the past medical history, past surgical history, medications, social history, and family history, and summarized in the note. Review of Systems:     All 10 point system is reviewed and negative otherwise mentioned in HPI. Past Medical History:     Past Medical History:   Diagnosis Date    Hyperlipidemia         Past Surgical History:     Past Surgical History:   Procedure Laterality Date    APPENDECTOMY      HERNIA REPAIR      NASAL SEPTUM SURGERY      TONSILLECTOMY          Medications Prior to Admission:       Prior to Admission medications    Medication Sig Start Date End Date Taking? Authorizing Provider   Multiple Vitamins-Minerals (THERAPEUTIC MULTIVITAMIN-MINERALS) tablet Take 1 tablet by mouth daily   Yes Historical Provider, MD   Melatonin 10 MG TABS Take 10 mg by mouth nightly   Yes Historical Provider, MD   docusate sodium (COLACE) 100 MG capsule Take 100 mg by mouth daily   Yes Historical Provider, MD   Calcium-Magnesium-Vitamin D ER (CALCIUM 1200+D3) 600- MG-MG-UNIT TB24 Take 1 tablet by mouth daily   Yes Historical Provider, MD   fluticasone (FLONASE) 50 MCG/ACT nasal spray 2 sprays by Nasal route daily 4/13/18  Yes DEYVI Rowley - CNP   atorvastatin (LIPITOR) 20 MG tablet Take 20 mg by mouth daily  10/26/17  Yes Historical Provider, MD   aspirin 81 MG tablet Take 81 mg by mouth daily    Yes Historical Provider, MD   OMEPRAZOLE PO Take 20 mg by mouth daily    Yes Historical Provider, MD        Allergies:       Patient has no known allergies. Social History:     Tobacco:    reports that he is a non-smoker but has been exposed to tobacco smoke. He has never used smokeless tobacco.  Alcohol:      reports current alcohol use. Drug Use:  reports no history of drug use. Family History:     History reviewed. No pertinent family history.       Physical Exam:     Vitals:  BP (!) 110/91   Pulse 79   Temp 98.1 °F (36.7 °C)   Resp 17   Ht 5' 11\" (1.803 m)   Wt 224 lb (101.6 kg)   SpO2 94%   BMI 31.24 kg/m²   Temp (24hrs), Av.1 °F (36.7 °C), Min:98.1 °F (36.7 °C), Max:98.1 °F (36.7 °C)          General appearance - alert, well appearing, and in no acute distress  Mental status - oriented to person, place, and time with normal affect  Head - normocephalic and atraumatic  Eyes - pupils equal and reactive, extraocular eye movements intact, conjunctiva clear  Ears - hearing appears to be intact  Nose - no drainage noted  Mouth - mucous membranes moist  Neck - supple, no carotid bruits, thyroid not palpable  Chest - clear to auscultation, normal effort  Heart - normal rate, regular rhythm, no murmur  Abdomen - soft, nontender, nondistended, bowel sounds present all four quadrants, no masses, hepatomegaly or splenomegaly  Neurological - normal speech, no focal findings or movement disorder noted, cranial nerves II through XII grossly intact  Extremities - peripheral pulses palpable, no pedal edema or calf pain with palpation  Skin - no gross lesions, rashes, or induration noted        Data:     Labs:    Hematology:  Recent Labs     20   WBC 5.4   RBC 4.71   HGB 15.1   HCT 45.7   MCV 97.0   MCH 32.1   MCHC 33.0   RDW 14.1      MPV 10.9   SEDRATE 7   DDIMER 0.70     Chemistry:  Recent Labs     20  0951     --    K 3.9  --      --    CO2 25  --    GLUCOSE 141*  --    BUN 18  --    CREATININE 0.98  --    MG 1.6  --    ANIONGAP 15  --    LABGLOM >60  --    GFRAA >60  --    CALCIUM 9.8  --    PROBNP 118  --    TROPHS 13 16   CKTOTAL 194  --    CKMB 4.1  --    MYOGLOBIN 33  --      Recent Labs     20  040   TSH 5.44*       No results found for: INR, PROTIME    No results found for: SPECIAL  No results found for: CULTURE    No results found for: POCPH, PHART, PH, POCPCO2, BCU6FGB, PCO2, POCPO2, PO2ART, PO2, POCHCO3, SSZ2MNU, HCO3, NBEA, PBEA, BEART, BE, THGBART, THB, RVL5ZMM, BWPH3JLZ, O5LRTDDW, O2SAT, FIO2    Radiology:    Jayna Campos Chest Portable    Result Date: 1/28/2020  Mild cardiomegaly. Otherwise, unremarkable single AP portable view of the chest.     Ct Chest Pulmonary Embolism W Contrast    Result Date: 1/28/2020  1. No evidence of acute pulmonary embolism. 2. Moderate cardiomegaly. All radiological studies reviewed                Code Status:  Full Code    Electronically signed by Marcela Steen MD on 1/28/2020 at 11:38 AM     Copy sent to Dr. Yosvany Wolf MD    This note was created with the assistance of a speech-recognition program.  Although the intention is to generate a document that actually reflects the content of the visit, no guarantees can be provided that every mistake has been identified and corrected by editing. Note was updated later by me after  physical examination and  completion of the assessment.

## 2020-01-28 NOTE — PROGRESS NOTES
Transitions of Care Pharmacy Service   Medication Review    The patient's list of current home medications has been reviewed. Source(s) of information: Patient/ Surescripts    Based on information provided by the above source(s), I have updated the patient's home med list as described below. Please review the ACTION REQUESTED section of this note below for any discrepancies on current hospital orders. I changed or updated the following medications on the patient's home medication list:  Discontinued none     Added Docusate 100mg nightly  Melatonin 10mg nightly  Multivitamin (Senior) 1 daily  Calcium/Mg/vit D 1 tab daily  Tumeric 1 tab daily     Adjusted   Omeprazole (no dose) to 20mg daily (OTC)   Other Notes none         PROVIDER ACTION REQUESTED  Discrepancies on current hospital orders that need to be addressed by a physician/nurse practitioner:    Medication Action Requested        none         Please feel free to call me with any questions about this encounter. Thank you. Fareed Smith [address] [place] [address]   Transitions of Care Pharmacy Service  Phone:  [phone]  Fax: [phone]      Electronically signed by Fareed Smith [facility] on 1/28/2020 at 10:16 AM           Not in a hospital admission.

## 2020-01-28 NOTE — CARE COORDINATION
Case Management Initial Discharge Plan  Jay Jay Kaplan,         Readmission Risk              Risk of Unplanned Readmission:        0             Met with:patient to discuss discharge plans. Information verified: address, contacts, phone number, , insurance Yes  PCP: Pj Pace MD  Date of last visit: 2019    Insurance Provider: Alexis Arcos    Discharge Planning  Current Residence:   house  Living Arrangements:   spouse   Home has 1 stories/4 stairs to climb  Support Systems:   spouse  Current Services PTA:   no Supplier: none  Patient able to perform ADL's:Independent  DME used to aid ambulation prior to admission: none  During admission: none    Potential Assistance Needed:   may need hc or outpatient therapy    [facility]acy: [facility]   Potential Assistance Purchasing Medications:   no  Does patient want to participate in local refill/ meds to beds program?   no    Patient agreeable to home care: tbd  [place] of choice provided:  yes      Type of Home Care Services:   tbd  Patient expects to be discharged to:   home    Prior SNF/Rehab Placement and Facility: no  Agreeable to SNF/Rehab: No  [place] of choice provided: yes   Evaluation: yes    Expected Discharge date:   20  Follow Up Appointment: Best Day/ Time:      Transportation provider: spouse  Transportation arrangements needed for discharge: No    Discharge Plan: home with spouse. Patient may need home care or outpatient therapy. Patient lives at home with spouse in a one story home with 4 steps to enter. Patient was independent with adl's and does not have dme. Patient was still working pta. Patient stated he drinks 6-8 glasses of wine 5 days per week. Patient stated he drinks due to hx of depression/self medicating. Patient stated he has discussed with pcp and been prescribed medication but does not work.  LSW offered mental health resources with goal to speak to psychiatrist regarding medication and accepted list. Patient denied any drug use. SBIRT completed.         Electronically signed by BRENDA Soria on 1/28/20 at 11:24 AM

## 2020-01-29 VITALS
HEART RATE: 58 BPM | SYSTOLIC BLOOD PRESSURE: 138 MMHG | BODY MASS INDEX: 31.95 KG/M2 | TEMPERATURE: 97.5 F | OXYGEN SATURATION: 99 % | WEIGHT: 228.2 LBS | DIASTOLIC BLOOD PRESSURE: 80 MMHG | RESPIRATION RATE: 16 BRPM | HEIGHT: 71 IN

## 2020-01-29 LAB
ABSOLUTE EOS #: 0.13 K/UL (ref 0–0.44)
ABSOLUTE IMMATURE GRANULOCYTE: 0.02 K/UL (ref 0–0.3)
ABSOLUTE LYMPH #: 2.22 K/UL (ref 1.1–3.7)
ABSOLUTE MONO #: 0.62 K/UL (ref 0.1–1.2)
ANION GAP SERPL CALCULATED.3IONS-SCNC: 9 MMOL/L (ref 9–17)
BASOPHILS # BLD: 0 % (ref 0–2)
BASOPHILS ABSOLUTE: <0.03 K/UL (ref 0–0.2)
BUN BLDV-MCNC: 15 MG/DL (ref 8–23)
BUN/CREAT BLD: 14 (ref 9–20)
CALCIUM SERPL-MCNC: 8.6 MG/DL (ref 8.6–10.4)
CHLORIDE BLD-SCNC: 104 MMOL/L (ref 98–107)
CO2: 26 MMOL/L (ref 20–31)
CREAT SERPL-MCNC: 1.05 MG/DL (ref 0.7–1.2)
DIFFERENTIAL TYPE: ABNORMAL
EOSINOPHILS RELATIVE PERCENT: 2 % (ref 1–4)
GFR AFRICAN AMERICAN: >60 ML/MIN
GFR NON-AFRICAN AMERICAN: >60 ML/MIN
GFR SERPL CREATININE-BSD FRML MDRD: ABNORMAL ML/MIN/{1.73_M2}
GFR SERPL CREATININE-BSD FRML MDRD: ABNORMAL ML/MIN/{1.73_M2}
GLUCOSE BLD-MCNC: 112 MG/DL (ref 70–99)
HCT VFR BLD CALC: 40.8 % (ref 40.7–50.3)
HEMOGLOBIN: 13.4 G/DL (ref 13–17)
IMMATURE GRANULOCYTES: 0 %
INR BLD: 1
LYMPHOCYTES # BLD: 36 % (ref 24–43)
MCH RBC QN AUTO: 32 PG (ref 25.2–33.5)
MCHC RBC AUTO-ENTMCNC: 32.8 G/DL (ref 28.4–34.8)
MCV RBC AUTO: 97.4 FL (ref 82.6–102.9)
MONOCYTES # BLD: 10 % (ref 3–12)
NRBC AUTOMATED: 0 PER 100 WBC
PDW BLD-RTO: 14.3 % (ref 11.8–14.4)
PLATELET # BLD: 160 K/UL (ref 138–453)
PLATELET ESTIMATE: ABNORMAL
PMV BLD AUTO: 10.6 FL (ref 8.1–13.5)
POTASSIUM SERPL-SCNC: 3.9 MMOL/L (ref 3.7–5.3)
PROTHROMBIN TIME: 10.7 SEC (ref 9.7–11.6)
RBC # BLD: 4.19 M/UL (ref 4.21–5.77)
RBC # BLD: ABNORMAL 10*6/UL
SEG NEUTROPHILS: 52 % (ref 36–65)
SEGMENTED NEUTROPHILS ABSOLUTE COUNT: 3.1 K/UL (ref 1.5–8.1)
SODIUM BLD-SCNC: 139 MMOL/L (ref 135–144)
TROPONIN INTERP: NORMAL
TROPONIN T: NORMAL NG/ML
TROPONIN, HIGH SENSITIVITY: 16 NG/L (ref 0–22)
WBC # BLD: 6.1 K/UL (ref 3.5–11.3)
WBC # BLD: ABNORMAL 10*3/UL

## 2020-01-29 PROCEDURE — 96366 THER/PROPH/DIAG IV INF ADDON: CPT

## 2020-01-29 PROCEDURE — 2500000003 HC RX 250 WO HCPCS: Performed by: FAMILY MEDICINE

## 2020-01-29 PROCEDURE — 85610 PROTHROMBIN TIME: CPT

## 2020-01-29 PROCEDURE — 6370000000 HC RX 637 (ALT 250 FOR IP): Performed by: FAMILY MEDICINE

## 2020-01-29 PROCEDURE — 84484 ASSAY OF TROPONIN QUANT: CPT

## 2020-01-29 PROCEDURE — 85025 COMPLETE CBC W/AUTO DIFF WBC: CPT

## 2020-01-29 PROCEDURE — G0378 HOSPITAL OBSERVATION PER HR: HCPCS

## 2020-01-29 PROCEDURE — 96372 THER/PROPH/DIAG INJ SC/IM: CPT

## 2020-01-29 PROCEDURE — 36415 COLL VENOUS BLD VENIPUNCTURE: CPT

## 2020-01-29 PROCEDURE — 96376 TX/PRO/DX INJ SAME DRUG ADON: CPT

## 2020-01-29 PROCEDURE — 93005 ELECTROCARDIOGRAM TRACING: CPT | Performed by: FAMILY MEDICINE

## 2020-01-29 PROCEDURE — 80048 BASIC METABOLIC PNL TOTAL CA: CPT

## 2020-01-29 PROCEDURE — 6360000002 HC RX W HCPCS: Performed by: FAMILY MEDICINE

## 2020-01-29 RX ADMIN — ENOXAPARIN SODIUM 40 MG: 40 INJECTION SUBCUTANEOUS at 08:54

## 2020-01-29 RX ADMIN — FAMOTIDINE 20 MG: 10 INJECTION, SOLUTION INTRAVENOUS at 08:55

## 2020-01-29 RX ADMIN — ASPIRIN 81 MG: 81 TABLET, COATED ORAL at 08:54

## 2020-01-29 ASSESSMENT — PAIN SCALES - GENERAL: PAINLEVEL_OUTOF10: 0

## 2020-01-29 NOTE — PROGRESS NOTES
Physical Therapy  {PT ALL NOTES:045205}  Cora Nissen is a 81 y.o. male who presents to the emergency department via EMS due to sensation of his heart racing. Patient states he was fine when he went to bed. He woke this morning and his heart was pounding. He states he felt forceful and rapid. He used a home monitor and found his heart rate to be 160. He called paramedics on their arrival heart rate sinus tach in the 130s. He was transported here he is 130s sinus tach versus a flutter on arrival.  Patient denies any previous cardiac history. He is also markedly hypertensive. He denies being on hypertensive medications. He denies chest pain or pressure. He states he just has the palpitations. He has no shortness of breath.   No complaint of headache nausea.

## 2020-01-29 NOTE — CONSULTS
20 mg by mouth daily  10/26/17  Yes Historical Provider, MD   aspirin 81 MG tablet Take 81 mg by mouth daily    Yes Historical Provider, MD   OMEPRAZOLE PO Take 20 mg by mouth daily    Yes Historical Provider, MD       Allergies:  Patient has no known allergies. Social History:   reports that he is a non-smoker but has been exposed to tobacco smoke. He has never used smokeless tobacco. He reports current alcohol use. He reports that he does not use drugs. Family History: family history is not on file. REVIEW OF SYSTEMS:    · Constitutional: there has been no unanticipated weight loss. There's been No change in energy level, No change in activity level. · Eyes: No visual changes or diplopia. No scleral icterus. · ENT: No Headaches, hearing loss or vertigo. No mouth sores or sore throat. · Cardiovascular: No chest pain, No dyspnea on exertion, yes palpitations or No loss of consciousness. No cough, hemoptysis, Yes pleuritic pain, or phlebitis. · Respiratory: No cough or wheezing, no sputum production. No hematemesis. · Gastrointestinal: No abdominal pain, appetite loss, blood in stools. No change in bowel or bladder habits. · Genitourinary: No dysuria, trouble voiding, or hematuria. · Musculoskeletal:  No gait disturbance, No weakness or joint complaints. · Integumentary: No rash or pruritis. · Neurological: No headache, diplopia, change in muscle strength, numbness or tingling. No change in gait, balance, coordination, mood, affect, memory, mentation, behavior. · Psychiatric: No anxiety, or depression. · Endocrine: No temperature intolerance. No excessive thirst, fluid intake, or urination. No tremor. · Hematologic/Lymphatic: No abnormal bruising or bleeding, blood clots or swollen lymph nodes. · Allergic/Immunologic: No nasal congestion or hives.     PHYSICAL EXAM:    Physical Examination:    BP (!) 143/67   Pulse 56   Temp 97.6 °F (36.4 °C) (Oral)   Resp 18   Ht 5' 11\" (1.803 m)   Wt 224 lb (101.6 kg)   SpO2 96%   BMI 31.24 kg/m²    Constitutional and General Appearance: alert, cooperative, no distress and appears stated age  [de-identified]: PERRL, no cervical lymphadenopathy. No masses palpable. Normal oral mucosa  Respiratory:  · Normal excursion and expansion without use of accessory muscles  · Resp Auscultation: Good respiratory effort. No for increased work of breathing. On auscultation: clear to auscultation bilaterally  Cardiovascular:  · The apical impulse is not displaced  · Heart tones are crisp and normal. regular S1 and S2.  · Jugular venous pulsation Normal  · The carotid upstroke is normal in amplitude and contour without delay or bruit  · Peripheral pulses are symmetrical and full   Abdomen:  · No masses or tenderness  · Bowel sounds present  Extremities:  ·  No Cyanosis or Clubbing  ·  Lower extremity edema: No  ·  Skin: Warm and dry  Neurological:  · Alert and oriented. · Moves all extremities well  · No abnormalities of mood, affect, memory, mentation, or behavior are noted    DATA:    Diagnostics:      EKG: atrial fibrillation with controlled ventricular response  ECHO:   Left ventricle is normal in size. Mild left ventricular hypertrophy. Global left ventricular systolic function is normal with an estimated  ejection fraction of 55 % . Due to the technical limitations of this study not all wall segments were  visualized. Mild aortic insufficiency. Thickened mitral valve leaflets. Trivial mitral regurgitation  Labs:     CBC:   Recent Labs     01/28/20 0402   WBC 5.4   HGB 15.1   HCT 45.7        BMP:   Recent Labs     01/28/20 0402      K 3.9   CO2 25   BUN 18   CREATININE 0.98   LABGLOM >60   GLUCOSE 141*     BNP: No results for input(s): BNP in the last 72 hours. PT/INR: No results for input(s): PROTIME, INR in the last 72 hours. APTT:No results for input(s): APTT in the last 72 hours.   CARDIAC ENZYMES:  Recent Labs     01/28/20 0402   CKTOTAL 194   CKMB 4.1 FASTING LIPID PANEL:  Lab Results   Component Value Date    HDL 47 08/05/2016    TRIG 124 08/05/2016     LIVER PROFILE:No results for input(s): AST, ALT, LABALBU in the last 72 hours. IMPRESSION:    Patient Active Problem List   Diagnosis    Atrial fibrillation/flutter ([address]       RECOMMENDATIONS:  1. Patient has paroxysmal atrial fibrillation which is new onset. He has not had any previous episodes and this is his first episode. He has converted to normal sinus rhythm. His chads vas score is practically 0. Recommend aspirin for stroke prophylaxis. Discontinue diltiazem drip for now. Probably will not need an antiarrhythmic prior to discharge since he is in normal sinus rhythm. Will follow    Discussed with patient and nursing.     King Mile MD  [place] [facility] \[facility]\"" cardiology

## 2020-01-29 NOTE — PROGRESS NOTES
[facility].,   Section of Cardiology  Progress Note      Date:  1/29/2020  Patient: Jason Mcdowell  Admission:  1/28/2020  4:00 AM  Admit DX: Atrial fibrillation/flutter ([facility] [address] [I48.91, I48.92]  Age:  81 y.o., 1950                           LOS: 1 day     Reason for evaluation:   Paroxysmal atrial fibrillation      SUBJECTIVE:     The patient was seen and examined. Notes and labs reviewed. There were not complications over night. He continues to be in normal sinus rhythm. Patient's cardiac review of systems: negative. The patient is generally feeling completely resolved. OBJECTIVE:    BP (!) 152/90   Pulse 61   Temp 97.5 °F (36.4 °C) (Oral)   Resp 18   Ht 5' 11\" (1.803 m)   Wt 228 lb 3.2 oz (103.5 kg)   SpO2 97%   BMI 31.83 kg/m²   No intake or output data in the 24 hours ending 01/29/20 0913    EXAM:   CONSTITUTIONAL:  awake, alert, cooperative, no apparent distress, and appears stated age. HEENT: Normal jugular venous pulsations, no carotid bruits. Head is atraumatic, normocephalic. Eyes and oral mucosa are normal.  LUNGS: Good respiratory effort. No for increased work of breathing. On auscultation: clear to auscultation bilaterally  CARDIOVASCULAR:  Normal apical impulse, regular rate and rhythm, normal S1 and S2, no S3 or S4, and no murmur or rub noted. ABDOMEN: Soft, nontender, nondistended. Bowel sounds present. No masses or tenderness. SKIN: Warm and dry. EXTREMITIES: No lower extremity edema. Motor movement grossly intact. No cyanosis or clubbing.     Current Inpatient Medications:   enoxaparin  40 mg Subcutaneous Daily    famotidine (PEPCID) injection  20 mg Intravenous BID    atorvastatin  20 mg Oral Nightly    sodium chloride flush  10 mL Intravenous 2 times per day    aspirin  81 mg Oral Daily       IV Infusions (if any):   sodium chloride 100 mL/hr at 01/28/20 6926    diltiazem (CARDIZEM) 125 mg in dextrose 5% 125 mL infusion Stopped (01/29/20 0025) Diagnostics:   Telemetry: Sinus  EKG: normal EKG, normal sinus rhythm, unchanged from previous tracings. Labs:   CBC:   Recent Labs     01/28/20 0402 01/29/20 0347   WBC 5.4 6.1   HGB 15.1 13.4   HCT 45.7 40.8    160     BMP:   Recent Labs     01/28/20  0402 01/29/20 0347    139   K 3.9 3.9   CO2 25 26   BUN 18 15   CREATININE 0.98 1.05   LABGLOM >60 >60   GLUCOSE 141* 112*     BNP: No results for input(s): BNP in the last 72 hours. PT/INR:   Recent Labs     01/29/20 0347   PROTIME 10.7   INR 1.0     APTT:No results for input(s): APTT in the last 72 hours. CARDIAC ENZYMES:  Recent Labs     01/28/20 0402   CKTOTAL 194   CKMB 4.1     FASTING LIPID PANEL:  Lab Results   Component Value Date    HDL 47 08/05/2016    TRIG 124 08/05/2016     LIVER PROFILE:No results for input(s): AST, ALT, LABALBU in the last 72 hours. ASSESSMENT:    Patient Active Problem List   Diagnosis    Atrial fibrillation/flutter ([facility] [address]       PLAN:    1. Patient has paroxysmal atrial fibrillation and is in sinus rhythm. He has a chadsvasc score of 0. I will recommend aspirin for stroke prophylaxis. He can be discharged today with outpatient follow up with Dr Pete Reyes. Please see orders. Discussed with patient and nursing.     Sushma Berger MD

## 2020-01-29 NOTE — DISCHARGE INSTR - DIET

## 2020-01-29 NOTE — PLAN OF CARE
Problem: Falls - Risk of:  Goal: Will remain free from falls  Description  Will remain free from falls  Outcome: Ongoing     Problem: Cardiac:  Goal: Ability to maintain an adequate cardiac output will improve  Description  Ability to maintain an adequate cardiac output will improve  Outcome: Ongoing

## 2020-01-29 NOTE — DISCHARGE SUMMARY
[address] [place] [address],    Adult Hospitalist      Patient ID: Carlos Mckinley  MRN: [id number]     Acct:  [de-identified]       Patient's PCP: Stephany St MD    Admit Date: 1/28/2020     Discharge Date:   1/29/20    Admitting Physician: Ronnie Ojeda MD    Discharge Physician: Pallavi Ott MD     CONSULTANTS: Patient Care Team:  Stephany St MD as PCP - General      Active Discharge Diagnoses:  · Atrial fibrillation with RVR/flutter  · History of aortic and mitral regurgitation  · History of paroxysmal atrial tachycardia  · Hyperlipidemia   · Essential hypertension  · Gastroesophageal reflux disease        Hospital Course:  Carlos Mckinley is a 81 y.o.  male who presents with Tachycardia  This 57-year-old  Jacquie Cage was been admitted by emergency department, patient came to the ER with complaint of having palpitations/tachycardia, patient tells me that he was actually in his bed started noticing some pounding of his heart on his left side, checked his pulse and found that his pulse was very rapid, called EMS, further was noticed in the emergency room to be in atrial fibrillation with RVR/flutter, patient tells me he does not have any history of Afib in the past, he does follow up with Cardiology for mitral and aortic regurgitation, tells me had a stress test last year was negative, also see has a history of paroxysmal atrial tachycardia in the past, patient admitted for further management, admitted for atrial fibrillation with RVR started IV Cardizem, patient over the course converted back to sinus rhythm, cardiology evaluated the patient underwent echocardiogram which showed an ejection fraction of 55%, patient chads score is 0, cardiology evaluate the patient recommended no anticoagulation, and no antiarrhythmic, at this point patient rate is well controlled he was told to have a close follow-up with cardiology discharge from the hospital in stable condition    The plan was discussed in detail with patient who agreed with the plan and verbalized understanding . The patient was seen and examined on day of discharge and this discharge summary is in conjunction with any daily progress note from day of discharge. Hospital Data:    Labs:    Hematology:  Recent Labs     01/28/20 0402 01/29/20 0347   WBC 5.4 6.1   RBC 4.71 4.19*   HGB 15.1 13.4   HCT 45.7 40.8   MCV 97.0 97.4   MCH 32.1 32.0   MCHC 33.0 32.8   RDW 14.1 14.3    160   MPV 10.9 10.6   SEDRATE 7  --    INR  --  1.0   DDIMER 0.70  --      Chemistry:  Recent Labs     01/28/20 0402 01/28/20  1541 01/28/20  2131 01/29/20 0347     --   --   --  139   K 3.9  --   --   --  3.9     --   --   --  104   CO2 25  --   --   --  26   GLUCOSE 141*  --   --   --  112*   BUN 18  --   --   --  15   CREATININE 0.98  --   --   --  1.05   MG 1.6  --   --   --   --    ANIONGAP 15  --   --   --  9   LABGLOM >60  --   --   --  >60   GFRAA >60  --   --   --  >60   CALCIUM 9.8  --   --   --  8.6   PROBNP 118  --   --   --   --    TROPHS 13   < > 13 15 16   CKTOTAL 194  --   --   --   --    CKMB 4.1  --   --   --   --    MYOGLOBIN 33  --   --   --   --     < > = values in this interval not displayed. Recent Labs     01/28/20 0402   TSH 5.44*     Lab Results   Component Value Date    INR 1.0 01/29/2020    PROTIME 10.7 01/29/2020     No results found for: SPECIAL  No results found for: CULTURE    No results found for: POCPH, PHART, PH, POCPCO2, XCW4VHT, PCO2, POCPO2, PO2ART, PO2, POCHCO3, DGD6VWG, HCO3, NBEA, PBEA, BEART, BE, THGBART, THB, ZAC3VIH, WFAM7GBY, R0PZOJXG, O2SAT, FIO2    Radiology:    Xr Chest Portable    Result Date: 1/28/2020  Mild cardiomegaly. Otherwise, unremarkable single AP portable view of the chest.     Ct Chest Pulmonary Embolism W Contrast    Result Date: 1/28/2020  1. No evidence of acute pulmonary embolism. 2. Moderate cardiomegaly.          All radiological studies reviewed      Reviews of Symptoms:    A 10 point system is reviewed and  negative except described in hospital course    Physical Exam:    Vitals:  /80   Pulse 58   Temp 97.5 °F (36.4 °C) (Oral)   Resp 16   Ht 5' 11\" (1.803 m)   Wt 228 lb 3.2 oz (103.5 kg)   SpO2 99%   BMI 31.83 kg/m²   Temp (24hrs), Av.5 °F (36.4 °C), Min:97.2 °F (36.2 °C), Max:97.7 °F (36.5 °C)      General appearance - alert, well appearing, and in no acute distress  Mental status - oriented to person, place, and time with normal affect  Head - normocephalic and atraumatic  Eyes - pupils equal and reactive, extraocular eye movements intact, conjunctiva clear  Ears - hearing appears to be intact  Nose - no drainage noted  Mouth - mucous membranes moist  Neck - supple, no carotid bruits, thyroid not palpable  Chest - clear to auscultation, normal effort  Heart - normal rate, regular rhythm, no murmur  Abdomen - soft, nontender, nondistended, bowel sounds present all four quadrants, no masses, hepatomegaly or splenomegaly  Neurological - normal speech, no focal findings or movement disorder noted, cranial nerves II through XII grossly intact  Extremities - peripheral pulses palpable, no pedal edema or calf pain with palpation  Skin - no gross lesions, rashes, or induration noted      Consults:  IP CONSULT TO INTERNAL MEDICINE  IP CONSULT TO CARDIOLOGY  IP CONSULT TO CARDIOLOGY    Disposition: Home    Discharged Condition: Stable    Follow Up: Indra Bullock MD  [address] [place] [address] Dr Lawler [address] [place] [id number]  [phone]          Luis Sebastian MD  [address] [place] [address]  [phone]    In 1 week        Lab Frequency Next Occurrence   Initiate Oxygen Therapy Protocol     Up as tolerated     Pulse oximetry, continuous     Up with assistance     Intake and output     Initiate Oxygen Therapy Protocol     Pulse Oximetry Spot Check     Basic Metabolic Panel w/ Reflex to MG     CBC auto differential           Diet: DIET GENERAL;    Discharge Medications:    Elmer Stanford   Home Medication Instructions [id number]    Printed on:01/29/20 2593   Medication Information                      aspirin 81 MG tablet  Take 81 mg by mouth daily              atorvastatin (LIPITOR) 20 MG tablet  Take 20 mg by mouth daily              Calcium-Magnesium-Vitamin D ER (CALCIUM 1200+D3) 600- MG-MG-UNIT TB24  Take 1 tablet by mouth daily             docusate sodium (COLACE) 100 MG capsule  Take 100 mg by mouth daily             fluticasone (FLONASE) 50 MCG/ACT nasal spray  2 sprays by Nasal route daily             Melatonin 10 MG TABS  Take 10 mg by mouth nightly             Multiple Vitamins-Minerals (THERAPEUTIC MULTIVITAMIN-MINERALS) tablet  Take 1 tablet by mouth daily             OMEPRAZOLE PO  Take 20 mg by mouth daily                  Code Status:  Full Code    Time Spent on discharge is  35 mins in patient examination, evaluation, counseling as well as medication reconciliation, prescriptions for required medications, discharge plan and follow up. Electronically signed by Catalina Valencia MD on 1/29/2020 at 4:39 PM     Thank you Dr. Joshua Freeman MD for the opportunity to be involved in this patient's care. This note was created with the assistance of a speech-recognition program.  Although the intention is to generate a document that actually reflects the content of the visit, no guarantees can be provided that every mistake has been identified and corrected by editing. Note was updated later by me after  physical examination and  completion of the assessment.

## 2020-01-29 NOTE — PLAN OF CARE
Problem: Falls - Risk of:  Goal: Will remain free from falls  Description  Will remain free from falls  1/29/2020 1017 by Angel Ta RN  Outcome: Ongoing  Note:   Patient up as tolerated. Free from falls. Hourly rounding in place.  Patient A/O x4  1/29/2020 0346 by Lora Blake RN  Outcome: Ongoing  Goal: Absence of physical injury  Description  Absence of physical injury  Outcome: Ongoing     Problem: Cardiac:  Goal: Ability to maintain an adequate cardiac output will improve  Description  Ability to maintain an adequate cardiac output will improve  1/29/2020 1017 by Angel Ta RN  Outcome: Ongoing  1/29/2020 0346 by Lora Blake RN  Outcome: Ongoing I have reviewed and confirmed nurses' notes regarding past medical, surgical, and family history.

## 2020-01-29 NOTE — PROGRESS NOTES
Nutrition Note    Type and Reason for Visit: Initial, Positive Nutrition Screen(false positive)    Nutrition screen completed by nursing. Issue that was identified on the screen was not confirmed. Screen positive for home TF/TPN, however no noted nutrition support and patient is on a general diet. He denied weight loss or decreased appetite per nursing screen. Patient will be re-evaluated based on length of stay or if otherwise requested.      Isrrael Zimmerman RD, LD, CNSC

## 2020-01-30 LAB
EKG ATRIAL RATE: 56 BPM
EKG P AXIS: 41 DEGREES
EKG P-R INTERVAL: 166 MS
EKG Q-T INTERVAL: 428 MS
EKG QRS DURATION: 112 MS
EKG QTC CALCULATION (BAZETT): 413 MS
EKG R AXIS: 22 DEGREES
EKG T AXIS: 12 DEGREES
EKG VENTRICULAR RATE: 56 BPM

## 2020-01-30 PROCEDURE — 93010 ELECTROCARDIOGRAM REPORT: CPT | Performed by: INTERNAL MEDICINE

## 2020-10-07 ENCOUNTER — HOSPITAL ENCOUNTER (OUTPATIENT)
Age: 70
Setting detail: SPECIMEN
Discharge: HOME | End: 2020-10-07
Payer: MEDICARE

## 2020-10-07 LAB
ALBUMIN SERPL-MCNC: 4.4 G/DL (ref 3.5–5.2)
ALBUMIN/GLOBULIN RATIO: 1.5 (ref 1–2.5)
ALP BLD-CCNC: 52 U/L (ref 40–129)
ALT SERPL-CCNC: 38 U/L (ref 5–41)
ANION GAP SERPL CALCULATED.3IONS-SCNC: 14 MMOL/L (ref 9–17)
AST SERPL-CCNC: 37 U/L
BILIRUB SERPL-MCNC: 0.79 MG/DL (ref 0.3–1.2)
BUN BLDV-MCNC: 16 MG/DL (ref 8–23)
BUN/CREAT BLD: ABNORMAL (ref 9–20)
C-REACTIVE PROTEIN: <0.3 MG/L (ref 0–5)
CALCIUM SERPL-MCNC: 9.8 MG/DL (ref 8.6–10.4)
CHLORIDE BLD-SCNC: 103 MMOL/L (ref 98–107)
CHOLESTEROL, FASTING: 167 MG/DL
CHOLESTEROL/HDL RATIO: 2.7
CO2: 24 MMOL/L (ref 20–31)
CREAT SERPL-MCNC: 0.98 MG/DL (ref 0.7–1.2)
GFR AFRICAN AMERICAN: >60 ML/MIN
GFR NON-AFRICAN AMERICAN: >60 ML/MIN
GFR SERPL CREATININE-BSD FRML MDRD: ABNORMAL ML/MIN/{1.73_M2}
GFR SERPL CREATININE-BSD FRML MDRD: ABNORMAL ML/MIN/{1.73_M2}
GLUCOSE BLD-MCNC: 110 MG/DL (ref 70–99)
HCT VFR BLD CALC: 45.5 % (ref 40.7–50.3)
HDLC SERPL-MCNC: 62 MG/DL
HEMOGLOBIN: 14.6 G/DL (ref 13–17)
LDL CHOLESTEROL: 92 MG/DL (ref 0–130)
MCH RBC QN AUTO: 33.4 PG (ref 25.2–33.5)
MCHC RBC AUTO-ENTMCNC: 32.1 G/DL (ref 28.4–34.8)
MCV RBC AUTO: 104.1 FL (ref 82.6–102.9)
NRBC AUTOMATED: 0 PER 100 WBC
PDW BLD-RTO: 12.4 % (ref 11.8–14.4)
PLATELET # BLD: 53 K/UL (ref 138–453)
PMV BLD AUTO: 13 FL (ref 8.1–13.5)
POTASSIUM SERPL-SCNC: 5.1 MMOL/L (ref 3.7–5.3)
PROSTATE SPECIFIC ANTIGEN: 2.65 UG/L
RBC # BLD: 4.37 M/UL (ref 4.21–5.77)
SODIUM BLD-SCNC: 141 MMOL/L (ref 135–144)
TOTAL PROTEIN: 7.3 G/DL (ref 6.4–8.3)
TRIGLYCERIDE, FASTING: 66 MG/DL
TSH SERPL DL<=0.05 MIU/L-ACNC: 3.27 MIU/L (ref 0.3–5)
VLDLC SERPL CALC-MCNC: NORMAL MG/DL (ref 1–30)
WBC # BLD: 5.9 K/UL (ref 3.5–11.3)

## 2020-10-18 ENCOUNTER — HOSPITAL ENCOUNTER (INPATIENT)
Age: 70
LOS: 1 days | Discharge: HOME | DRG: 274 | End: 2020-10-19
Attending: EMERGENCY MEDICINE | Admitting: INTERNAL MEDICINE
Payer: MEDICARE

## 2020-10-18 ENCOUNTER — APPOINTMENT (OUTPATIENT)
Dept: GENERAL RADIOLOGY | Age: 70
DRG: 274 | End: 2020-10-18
Payer: MEDICARE

## 2020-10-18 PROBLEM — I48.91 ATRIAL FIBRILLATION WITH RVR (HCC): Status: ACTIVE | Noted: 2020-10-18

## 2020-10-18 LAB
ABSOLUTE EOS #: 0.1 K/UL (ref 0–0.44)
ABSOLUTE EOS #: 0.11 K/UL (ref 0–0.44)
ABSOLUTE IMMATURE GRANULOCYTE: 0.01 K/UL (ref 0–0.3)
ABSOLUTE IMMATURE GRANULOCYTE: 0.02 K/UL (ref 0–0.3)
ABSOLUTE LYMPH #: 1.85 K/UL (ref 1.1–3.7)
ABSOLUTE LYMPH #: 2.56 K/UL (ref 1.1–3.7)
ABSOLUTE MONO #: 0.61 K/UL (ref 0.1–1.2)
ABSOLUTE MONO #: 0.63 K/UL (ref 0.1–1.2)
ALBUMIN SERPL-MCNC: 4.4 G/DL (ref 3.5–5.2)
ALBUMIN/GLOBULIN RATIO: ABNORMAL (ref 1–2.5)
ALP BLD-CCNC: 51 U/L (ref 40–129)
ALT SERPL-CCNC: 52 U/L (ref 5–41)
ANION GAP SERPL CALCULATED.3IONS-SCNC: 11 MMOL/L (ref 9–17)
ANION GAP SERPL CALCULATED.3IONS-SCNC: 9 MMOL/L (ref 9–17)
AST SERPL-CCNC: 55 U/L
BASOPHILS # BLD: 0 % (ref 0–2)
BASOPHILS # BLD: 1 % (ref 0–2)
BASOPHILS ABSOLUTE: 0.03 K/UL (ref 0–0.2)
BASOPHILS ABSOLUTE: <0.03 K/UL (ref 0–0.2)
BILIRUB SERPL-MCNC: 0.85 MG/DL (ref 0.3–1.2)
BILIRUBIN URINE: NEGATIVE
BUN BLDV-MCNC: 14 MG/DL (ref 8–23)
BUN BLDV-MCNC: 16 MG/DL (ref 8–23)
BUN/CREAT BLD: 15 (ref 9–20)
BUN/CREAT BLD: 17 (ref 9–20)
CALCIUM SERPL-MCNC: 9.5 MG/DL (ref 8.6–10.4)
CALCIUM SERPL-MCNC: 9.5 MG/DL (ref 8.6–10.4)
CHLORIDE BLD-SCNC: 100 MMOL/L (ref 98–107)
CHLORIDE BLD-SCNC: 104 MMOL/L (ref 98–107)
CO2: 26 MMOL/L (ref 20–31)
CO2: 27 MMOL/L (ref 20–31)
COLOR: YELLOW
COMMENT UA: NORMAL
CREAT SERPL-MCNC: 0.94 MG/DL (ref 0.7–1.2)
CREAT SERPL-MCNC: 0.94 MG/DL (ref 0.7–1.2)
DIFFERENTIAL TYPE: ABNORMAL
DIFFERENTIAL TYPE: ABNORMAL
EOSINOPHILS RELATIVE PERCENT: 2 % (ref 1–4)
EOSINOPHILS RELATIVE PERCENT: 2 % (ref 1–4)
GFR AFRICAN AMERICAN: >60 ML/MIN
GFR AFRICAN AMERICAN: >60 ML/MIN
GFR NON-AFRICAN AMERICAN: >60 ML/MIN
GFR NON-AFRICAN AMERICAN: >60 ML/MIN
GFR SERPL CREATININE-BSD FRML MDRD: ABNORMAL ML/MIN/{1.73_M2}
GLUCOSE BLD-MCNC: 120 MG/DL (ref 70–99)
GLUCOSE BLD-MCNC: 129 MG/DL (ref 70–99)
GLUCOSE URINE: NEGATIVE
HCT VFR BLD CALC: 43.5 % (ref 40.7–50.3)
HCT VFR BLD CALC: 43.6 % (ref 40.7–50.3)
HEMOGLOBIN: 14.4 G/DL (ref 13–17)
HEMOGLOBIN: 14.5 G/DL (ref 13–17)
IMMATURE GRANULOCYTES: 0 %
IMMATURE GRANULOCYTES: 0 %
KETONES, URINE: NEGATIVE
LEUKOCYTE ESTERASE, URINE: NEGATIVE
LYMPHOCYTES # BLD: 29 % (ref 24–43)
LYMPHOCYTES # BLD: 37 % (ref 24–43)
MCH RBC QN AUTO: 33.6 PG (ref 25.2–33.5)
MCH RBC QN AUTO: 34 PG (ref 25.2–33.5)
MCHC RBC AUTO-ENTMCNC: 33.1 G/DL (ref 28.4–34.8)
MCHC RBC AUTO-ENTMCNC: 33.3 G/DL (ref 28.4–34.8)
MCV RBC AUTO: 101.6 FL (ref 82.6–102.9)
MCV RBC AUTO: 102.1 FL (ref 82.6–102.9)
MONOCYTES # BLD: 10 % (ref 3–12)
MONOCYTES # BLD: 9 % (ref 3–12)
MYOGLOBIN: 32 NG/ML (ref 28–72)
MYOGLOBIN: 40 NG/ML (ref 28–72)
MYOGLOBIN: 41 NG/ML (ref 28–72)
NITRITE, URINE: NEGATIVE
NRBC AUTOMATED: 0 PER 100 WBC
NRBC AUTOMATED: 0 PER 100 WBC
PDW BLD-RTO: 12.1 % (ref 11.8–14.4)
PDW BLD-RTO: 12.1 % (ref 11.8–14.4)
PH UA: 6 (ref 5–8)
PLATELET # BLD: 157 K/UL (ref 138–453)
PLATELET # BLD: 182 K/UL (ref 138–453)
PLATELET ESTIMATE: ABNORMAL
PLATELET ESTIMATE: ABNORMAL
PMV BLD AUTO: 10.8 FL (ref 8.1–13.5)
PMV BLD AUTO: 11.7 FL (ref 8.1–13.5)
POTASSIUM SERPL-SCNC: 4.1 MMOL/L (ref 3.7–5.3)
POTASSIUM SERPL-SCNC: 4.4 MMOL/L (ref 3.7–5.3)
PROTEIN UA: NEGATIVE
RBC # BLD: 4.27 M/UL (ref 4.21–5.77)
RBC # BLD: 4.28 M/UL (ref 4.21–5.77)
RBC # BLD: ABNORMAL 10*6/UL
RBC # BLD: ABNORMAL 10*6/UL
SEG NEUTROPHILS: 52 % (ref 36–65)
SEG NEUTROPHILS: 58 % (ref 36–65)
SEGMENTED NEUTROPHILS ABSOLUTE COUNT: 3.59 K/UL (ref 1.5–8.1)
SEGMENTED NEUTROPHILS ABSOLUTE COUNT: 3.84 K/UL (ref 1.5–8.1)
SODIUM BLD-SCNC: 137 MMOL/L (ref 135–144)
SODIUM BLD-SCNC: 140 MMOL/L (ref 135–144)
SPECIFIC GRAVITY UA: 1.01 (ref 1–1.03)
TOTAL PROTEIN: 7.2 G/DL (ref 6.4–8.3)
TROPONIN INTERP: NORMAL
TROPONIN T: NORMAL NG/ML
TROPONIN, HIGH SENSITIVITY: 13 NG/L (ref 0–22)
TROPONIN, HIGH SENSITIVITY: 14 NG/L (ref 0–22)
TROPONIN, HIGH SENSITIVITY: 17 NG/L (ref 0–22)
TROPONIN, HIGH SENSITIVITY: 19 NG/L (ref 0–22)
TSH SERPL DL<=0.05 MIU/L-ACNC: 3.58 MIU/L (ref 0.3–5)
TURBIDITY: CLEAR
URINE HGB: NEGATIVE
UROBILINOGEN, URINE: NORMAL
WBC # BLD: 6.5 K/UL (ref 3.5–11.3)
WBC # BLD: 6.9 K/UL (ref 3.5–11.3)
WBC # BLD: ABNORMAL 10*3/UL
WBC # BLD: ABNORMAL 10*3/UL

## 2020-10-18 PROCEDURE — 96376 TX/PRO/DX INJ SAME DRUG ADON: CPT

## 2020-10-18 PROCEDURE — 6370000000 HC RX 637 (ALT 250 FOR IP): Performed by: INTERNAL MEDICINE

## 2020-10-18 PROCEDURE — 2580000003 HC RX 258: Performed by: INTERNAL MEDICINE

## 2020-10-18 PROCEDURE — 80053 COMPREHEN METABOLIC PANEL: CPT

## 2020-10-18 PROCEDURE — 6360000002 HC RX W HCPCS: Performed by: INTERNAL MEDICINE

## 2020-10-18 PROCEDURE — 6370000000 HC RX 637 (ALT 250 FOR IP): Performed by: NURSE PRACTITIONER

## 2020-10-18 PROCEDURE — 84484 ASSAY OF TROPONIN QUANT: CPT

## 2020-10-18 PROCEDURE — 2060000000 HC ICU INTERMEDIATE R&B

## 2020-10-18 PROCEDURE — 83874 ASSAY OF MYOGLOBIN: CPT

## 2020-10-18 PROCEDURE — 2500000003 HC RX 250 WO HCPCS: Performed by: EMERGENCY MEDICINE

## 2020-10-18 PROCEDURE — 85025 COMPLETE CBC W/AUTO DIFF WBC: CPT

## 2020-10-18 PROCEDURE — 81003 URINALYSIS AUTO W/O SCOPE: CPT

## 2020-10-18 PROCEDURE — 96365 THER/PROPH/DIAG IV INF INIT: CPT

## 2020-10-18 PROCEDURE — 99284 EMERGENCY DEPT VISIT MOD MDM: CPT

## 2020-10-18 PROCEDURE — 2580000003 HC RX 258: Performed by: EMERGENCY MEDICINE

## 2020-10-18 PROCEDURE — 2500000003 HC RX 250 WO HCPCS: Performed by: INTERNAL MEDICINE

## 2020-10-18 PROCEDURE — 84443 ASSAY THYROID STIM HORMONE: CPT

## 2020-10-18 PROCEDURE — 36415 COLL VENOUS BLD VENIPUNCTURE: CPT

## 2020-10-18 PROCEDURE — 80048 BASIC METABOLIC PNL TOTAL CA: CPT

## 2020-10-18 PROCEDURE — 93005 ELECTROCARDIOGRAM TRACING: CPT | Performed by: EMERGENCY MEDICINE

## 2020-10-18 PROCEDURE — 71045 X-RAY EXAM CHEST 1 VIEW: CPT

## 2020-10-18 RX ORDER — DOCUSATE SODIUM 100 MG/1
100 CAPSULE, LIQUID FILLED ORAL DAILY
Status: DISCONTINUED | OUTPATIENT
Start: 2020-10-18 | End: 2020-10-18

## 2020-10-18 RX ORDER — DOCUSATE SODIUM 100 MG/1
100 CAPSULE, LIQUID FILLED ORAL DAILY PRN
Status: DISCONTINUED | OUTPATIENT
Start: 2020-10-18 | End: 2020-10-19 | Stop reason: HOSPADM

## 2020-10-18 RX ORDER — DILTIAZEM HYDROCHLORIDE 5 MG/ML
10 INJECTION INTRAVENOUS ONCE
Status: COMPLETED | OUTPATIENT
Start: 2020-10-18 | End: 2020-10-18

## 2020-10-18 RX ORDER — M-VIT,TX,IRON,MINS/CALC/FOLIC 27MG-0.4MG
1 TABLET ORAL DAILY
Status: DISCONTINUED | OUTPATIENT
Start: 2020-10-18 | End: 2020-10-19 | Stop reason: HOSPADM

## 2020-10-18 RX ORDER — ACETAMINOPHEN 325 MG/1
650 TABLET ORAL EVERY 4 HOURS PRN
Status: DISCONTINUED | OUTPATIENT
Start: 2020-10-18 | End: 2020-10-19 | Stop reason: HOSPADM

## 2020-10-18 RX ORDER — METOPROLOL SUCCINATE 25 MG/1
25 TABLET, EXTENDED RELEASE ORAL DAILY
Status: DISCONTINUED | OUTPATIENT
Start: 2020-10-18 | End: 2020-10-19 | Stop reason: HOSPADM

## 2020-10-18 RX ORDER — SODIUM CHLORIDE 0.9 % (FLUSH) 0.9 %
10 SYRINGE (ML) INJECTION EVERY 12 HOURS SCHEDULED
Status: DISCONTINUED | OUTPATIENT
Start: 2020-10-18 | End: 2020-10-19 | Stop reason: HOSPADM

## 2020-10-18 RX ORDER — FLUTICASONE PROPIONATE 50 MCG
2 SPRAY, SUSPENSION (ML) NASAL DAILY
Status: DISCONTINUED | OUTPATIENT
Start: 2020-10-18 | End: 2020-10-19

## 2020-10-18 RX ORDER — VIT C/B6/B5/MAGNESIUM/HERB 173 50-5-6-5MG
1 CAPSULE ORAL DAILY
COMMUNITY

## 2020-10-18 RX ORDER — LANOLIN ALCOHOL/MO/W.PET/CERES
9 CREAM (GRAM) TOPICAL NIGHTLY
Status: DISCONTINUED | OUTPATIENT
Start: 2020-10-18 | End: 2020-10-19 | Stop reason: HOSPADM

## 2020-10-18 RX ORDER — ATORVASTATIN CALCIUM 20 MG/1
20 TABLET, FILM COATED ORAL NIGHTLY
Status: DISCONTINUED | OUTPATIENT
Start: 2020-10-18 | End: 2020-10-19 | Stop reason: HOSPADM

## 2020-10-18 RX ORDER — DILTIAZEM HYDROCHLORIDE 180 MG/1
180 CAPSULE, COATED, EXTENDED RELEASE ORAL DAILY
Status: DISCONTINUED | OUTPATIENT
Start: 2020-10-18 | End: 2020-10-19 | Stop reason: HOSPADM

## 2020-10-18 RX ORDER — ASPIRIN 81 MG/1
81 TABLET ORAL DAILY
Status: DISCONTINUED | OUTPATIENT
Start: 2020-10-18 | End: 2020-10-19 | Stop reason: HOSPADM

## 2020-10-18 RX ORDER — CALCIUM CARBONATE/VITAMIN D3 600 MG-10
1 TABLET ORAL DAILY
Status: DISCONTINUED | OUTPATIENT
Start: 2020-10-18 | End: 2020-10-19 | Stop reason: HOSPADM

## 2020-10-18 RX ORDER — ALPRAZOLAM 0.25 MG/1
0.25 TABLET ORAL 2 TIMES DAILY PRN
COMMUNITY

## 2020-10-18 RX ORDER — METOPROLOL SUCCINATE 25 MG/1
25 TABLET, EXTENDED RELEASE ORAL DAILY
COMMUNITY

## 2020-10-18 RX ORDER — SODIUM CHLORIDE 0.9 % (FLUSH) 0.9 %
10 SYRINGE (ML) INJECTION PRN
Status: DISCONTINUED | OUTPATIENT
Start: 2020-10-18 | End: 2020-10-19 | Stop reason: HOSPADM

## 2020-10-18 RX ADMIN — Medication 1 TABLET: at 09:22

## 2020-10-18 RX ADMIN — ENOXAPARIN SODIUM 40 MG: 40 INJECTION SUBCUTANEOUS at 16:10

## 2020-10-18 RX ADMIN — MELATONIN TAB 3 MG 9 MG: 3 TAB at 19:56

## 2020-10-18 RX ADMIN — DILTIAZEM HYDROCHLORIDE 5 MG/HR: 5 INJECTION INTRAVENOUS at 01:40

## 2020-10-18 RX ADMIN — METOPROLOL SUCCINATE 25 MG: 25 TABLET, EXTENDED RELEASE ORAL at 09:40

## 2020-10-18 RX ADMIN — ATORVASTATIN CALCIUM 20 MG: 20 TABLET, FILM COATED ORAL at 19:56

## 2020-10-18 RX ADMIN — MULTIPLE VITAMINS W/ MINERALS TAB 1 TABLET: TAB at 09:22

## 2020-10-18 RX ADMIN — Medication 10 ML: at 16:10

## 2020-10-18 RX ADMIN — Medication 10 ML: at 13:35

## 2020-10-18 RX ADMIN — SODIUM CHLORIDE, PRESERVATIVE FREE 10 ML: 5 INJECTION INTRAVENOUS at 21:00

## 2020-10-18 RX ADMIN — FOLIC ACID: 5 INJECTION, SOLUTION INTRAMUSCULAR; INTRAVENOUS; SUBCUTANEOUS at 16:10

## 2020-10-18 RX ADMIN — ASPIRIN 81 MG: 81 TABLET, COATED ORAL at 09:22

## 2020-10-18 RX ADMIN — DILTIAZEM HYDROCHLORIDE 180 MG: 180 CAPSULE, COATED, EXTENDED RELEASE ORAL at 09:22

## 2020-10-18 RX ADMIN — DILTIAZEM HYDROCHLORIDE 10 MG: 5 INJECTION, SOLUTION INTRAVENOUS at 00:55

## 2020-10-18 ASSESSMENT — PAIN SCALES - GENERAL
PAINLEVEL_OUTOF10: 0

## 2020-10-18 NOTE — PLAN OF CARE
Problem: Falls - Risk of:  Goal: Will remain free from falls  Description: Will remain free from falls  Outcome: Ongoing     Problem: Cardiac Output - Decreased:  Goal: Hemodynamic stability will improve  Description: Hemodynamic stability will improve  Outcome: Ongoing   Uses call light appropriately, fall precautions in place will continue to monitor. Pt on cardizem PO and will monitor HR and rhythm. Pt admitted to ICU this am atrial fibrillation - atrial flutter and now atrial fibrillation.

## 2020-10-18 NOTE — ED PROVIDER NOTES
EMERGENCY DEPARTMENT ENCOUNTER    Pt Name: Petros Canela  MRN: [id number]  [place] 1950  Date of evaluation: 10/18/20  CHIEF COMPLAINT       Chief Complaint   Patient presents with    Irregular Heart Beat     pt states he noticed heart racing around 11pm. pt has hx of afib-does not take blood thinners- denies cp, sob. pt took his metoprolol x2 when he noticed  his heart racing     HISTORY OF PRESENT ILLNESS   Patient is a 53-year-old male with PMH of atrial fibrillation, arthritis, GERD, hyperlipidemia and alcohol use who is brought in by EMS for evaluation of rapid heart rate. Symptoms started this evening while he was watching television. His heart rate is in the 180s. He took his metoprolol x2 and called 911. Patient has been feeling well otherwise. No fevers, cough, shortness breath, chest pain, abdominal pain, nausea, vomiting, changes in urine or stool. He does drink 4 beers daily. REVIEW OF SYSTEMS     Review of Systems   All other systems reviewed and are negative.     PASTMEDICAL HISTORY     Past Medical History:   Diagnosis Date    Arthritis     Atrial fibrillation (HCC)     GERD (gastroesophageal reflux disease)     acid reflex    Hyperlipidemia      SURGICAL HISTORY       Past Surgical History:   Procedure Laterality Date    APPENDECTOMY      HERNIA REPAIR      NASAL SEPTUM SURGERY      NASAL SEPTUM SURGERY      when 23 yrs old    TONSILLECTOMY       CURRENT MEDICATIONS       Previous Medications    ASPIRIN 81 MG TABLET    Take 81 mg by mouth daily     ATORVASTATIN (LIPITOR) 20 MG TABLET    Take 20 mg by mouth daily     CALCIUM-MAGNESIUM-VITAMIN D ER (CALCIUM 1200+D3) 600- MG-MG-UNIT TB24    Take 1 tablet by mouth daily    DOCUSATE SODIUM (COLACE) 100 MG CAPSULE    Take 100 mg by mouth daily    FLUTICASONE (FLONASE) 50 MCG/ACT NASAL SPRAY    2 sprays by Nasal route daily    MELATONIN 10 MG TABS    Take 10 mg by mouth nightly    MULTIPLE VITAMINS-MINERALS (THERAPEUTIC MULTIVITAMIN-MINERALS) TABLET    Take 1 tablet by mouth daily    OMEPRAZOLE PO    Take 20 mg by mouth daily      ALLERGIES     has No Known Allergies. FAMILY HISTORY     has no family status information on file. SOCIAL HISTORY       Social History     Tobacco Use    Smoking status: Passive Smoke Exposure - Never Smoker    Smokeless tobacco: Never Used   Substance Use Topics    Alcohol use: Yes    Drug use: No     PHYSICAL EXAM     INITIAL VITALS: /77   Pulse 118   Temp 97.9 °F (36.6 °C) (Oral)   Resp 18   Ht 5' 11\" (1.803 m)   Wt 209 lb (94.8 kg)   SpO2 97%   BMI 29.15 kg/m²    Physical Exam  HENT:      Head: Normocephalic. Right Ear: External ear normal.      Left Ear: External ear normal.      Nose: Nose normal.   Eyes:      Conjunctiva/sclera: Conjunctivae normal.   Cardiovascular:      Rate and Rhythm: Normal rate. Rhythm irregular. Pulmonary:      Effort: Pulmonary effort is normal.      Breath sounds: No wheezing, rhonchi or rales. Abdominal:      General: Abdomen is flat. Skin:     General: Skin is dry. Neurological:      Mental Status: He is alert. Mental status is at baseline. Psychiatric:         Mood and Affect: Mood normal.         Behavior: Behavior normal.         MEDICAL DECISION MAKING:   The patient is tachycardic, afebrile, nontoxic-appearing. Physical exam unremarkable. Based on history and exam unclear etiology of symptoms. ED plan for rate control, basic labs, chest x-ray, likely admission. CRITICAL CARE:       PROCEDURES:    Procedures    DIAGNOSTIC RESULTS   EKG:All EKG's are interpreted by the Emergency Department Physician who either signs or Co-signs this chart in the absence of a cardiologist.        RADIOLOGY:All plain film, CT, MRI, and formal ultrasound images (except ED bedside ultrasound) are read by the radiologist, see reports below, unless otherwisenoted in MDM or here.   XR CHEST PORTABLE   Final Result   Borderline cardiomegaly,

## 2020-10-18 NOTE — FLOWSHEET NOTE
Patient states well, feeling better. States good family support. No major needs worries.  honors patient with 's pin for his service.  leaves prayer card for possible follow up.     10/18/20 2582   Encounter Summary   Services provided to: Patient   Referral/Consult From: [facility]   Support System Spouse   Continue Visiting   (10-18-20)   Complexity of Encounter Low   Length of Encounter 15 minutes   Spiritual Assessment Completed Yes   Routine   Type Initial   Assessment Approachable;Calm   Intervention Explored feelings, thoughts, concerns; Active listening;Discussed belief system/Bahai practices/sarita   Outcome Expressed gratitude;Receptive;Engaged in conversation;Expressed feelings/needs/concerns

## 2020-10-18 NOTE — ED NOTES
Pt states he felt his heart racing around 11pm last night. Pt states he was already awake. He took 2 doses of his metoprolol thinking it would help. Pt does have hx of Afib, does not take a blood thinner, only baby aspirin. Pt denies cp, sob, fever, chills, N/V, dizziness, numbness/tingling. Pt is A&Ox4 and ambulates without assistance.       Adam Burnette RN  10/18/20 4145

## 2020-10-18 NOTE — CONSULTS
route daily 4/13/18   Tonya Saba APRN - CNP   atorvastatin (LIPITOR) 20 MG tablet Take 20 mg by mouth daily  10/26/17   Historical Provider, MD   aspirin 81 MG tablet Take 81 mg by mouth daily     Historical Provider, MD   OMEPRAZOLE PO Take 20 mg by mouth daily     Historical Provider, MD     Current Medications:    Current Facility-Administered Medications   Medication Dose Route Frequency Provider Last Rate Last Dose    dilTIAZem 125 mg in dextrose 5 % 125 mL infusion  5 mg/hr Intravenous Continuous Kelly Hunt MD 5 mL/hr at 10/18/20 0536 5 mg/hr at 10/18/20 0536    sodium chloride flush 0.9 % injection 10 mL  10 mL Intravenous 2 times per day Monique Willard MD        sodium chloride flush 0.9 % injection 10 mL  10 mL Intravenous PRN Monique Willard MD        acetaminophen (TYLENOL) tablet 650 mg  650 mg Oral Q4H PRN Monique Willard MD         Allergies:  Patient has no known allergies. Social History:    Social History     Socioeconomic History    Marital status:      Spouse name: Not on file    Number of children: Not on file    Years of education: Not on file    Highest education level: Not on file   Occupational History    Not on file   Social Needs    Financial resource strain: Not on file    Food insecurity     Worry: Not on file     Inability: Not on file    Transportation needs     Medical: Not on file     Non-medical: Not on file   Tobacco Use    Smoking status: Passive Smoke Exposure - Never Smoker    Smokeless tobacco: Never Used   Substance and Sexual Activity    Alcohol use:  Yes    Drug use: No    Sexual activity: Not on file   Lifestyle    Physical activity     Days per week: Not on file     Minutes per session: Not on file    Stress: Not on file   Relationships    Social connections     Talks on phone: Not on file     Gets together: Not on file     Attends [employer] service: Not on file     Active member of club or organization: Not on file     Attends meetings of clubs or organizations: Not on file     Relationship status: Not on file    Intimate partner violence     Fear of current or ex partner: Not on file     Emotionally abused: Not on file     Physically abused: Not on file     Forced sexual activity: Not on file   Other Topics Concern    Not on file   Social History Narrative    Not on file     Family History:   History reviewed. No pertinent family history.     · REVIEW OF SYSTEMS   CONSTITUTIONAL: negative  EYES:  negative  HEENT:  negative  RESPIRATORY: negative   CARDIOVASCULAR:  negative except for  palpitations  GASTROINTESTINAL:  negative  GENITOURINARY:  negative  INTEGUMENT:  negative  HEMATOLOGIC/LYMPHATIC:  negative  ALLERGIC:  Negative except for seasonal allergies  ENDOCRINE:  Negative, no history of diabetes  MUSCULOSKELETAL:  negative  NEUROLOGICAL:  Negative, no history of CVA  BEHAVIOR/PSYCH:  negative except for drinks alcohol daily    PHYSICAL EXAM:    Vitals:    VITALS:  BP (!) 108/94   Pulse 91   Temp 97.9 °F (36.6 °C) (Oral)   Resp 19   Ht 5' 11\" (1.803 m)   Wt 209 lb (94.8 kg)   SpO2 96%   BMI 29.15 kg/m²   24HR INTAKE/OUTPUT:      Intake/Output Summary (Last 24 hours) at 10/18/2020 0755  Last data filed at 10/18/2020 0136  Gross per 24 hour   Intake --   Output 400 ml   Net -400 ml       CONSTITUTIONAL:  awake, alert, cooperative, no apparent distress, and appears stated age  EYES: Sclera clear, conjunctiva normal  ENT:  normocepalic, without obvious abnormality  NECK:  supple, symmetrical, trachea midline, no carotid bruit, no JVD  BACK:  symmetric  LUNGS: Non-labored, good air exchange, clear to auscultation bilaterally, no crackles or wheezing  CARDIOVASCULAR:  Irregular rate and rhythm, normal S1 and S2, no S3, and no murmur noted, no rub.  dorsalis pedis, posterior tibial and bilateralpresent 1+  ABDOMEN:  Normal bowel sounds, soft, non-distended, non-tender  MUSCULOSKELETAL:  there is no redness, warmth, or swelling of the joints  Minimal bilateral lower leg edema. NEUROLOGIC:  Awake, alert, oriented to name, place and time. SKIN:  no bruising or bleeding, normal skin color, texture, turgor and no jaundice    DATA:   ECG: 10/17/2020:  Atrial flutter with variable AV conduction and incomplete RBBB and non-specific T wave abnormalities    ECHO: Date: 1/2020:  CONCLUSIONS     Summary  Left ventricle is normal in size. Mild left ventricular hypertrophy. Global left ventricular systolic function is normal with an estimated  ejection fraction of 55 % . Due to the technical limitations of this study not all wall segments were  visualized. Mild aortic insufficiency. Thickened mitral valve leaflets. Trivial mitral regurgitation.   No significant pericardial effusion is seen.     Signature  ----------------------------------------------------------------------------   Electronically signed by Dhara Reilly(Sonographer) on 01/28/2020 01:46   PM  ----------------------------------------------------------------------------     ----------------------------------------------------------------------------   Electronically signed by Sandor Swift(Interpreting physician) on   01/28/2020 02:19 PM    Stress Test:     Cardiology Labs:  Recent Labs     10/18/20  0049 10/18/20  0358 10/18/20  0634   MYOGLOBIN  --  41 40   TROPHS 13 19 17   TROPONINT NOT REPORTED NOT REPORTED NOT REPORTED     Warfarin PT/INR:  Lab Results   Component Value Date    PROTIME 10.7 01/29/2020    INR 1.0 01/29/2020     CBC:  Lab Results   Component Value Date    WBC 6.5 10/18/2020    RBC 4.28 10/18/2020    HGB 14.4 10/18/2020    HCT 43.5 10/18/2020    .6 10/18/2020    MCH 33.6 10/18/2020    MCHC 33.1 10/18/2020    RDW 12.1 10/18/2020     10/18/2020    MPV 10.8 10/18/2020     CMP:  Lab Results   Component Value Date     10/18/2020    K 4.4 10/18/2020     10/18/2020    CO2 27 10/18/2020    BUN 16 10/18/2020    CREATININE 0.94 10/18/2020 GFRAA >60 10/18/2020    LABGLOM >60 10/18/2020    GLUCOSE 120 10/18/2020    CALCIUM 9.5 10/18/2020     Magnesium:    Lab Results   Component Value Date    MG 1.6 01/28/2020     PTT:  No results found for: APTT, PTT  TSH:    Lab Results   Component Value Date    TSH 3.58 10/18/2020     BNP: No results for input(s): BNP, PROBNP in the last 72 hours. BMP:  Lab Results   Component Value Date     10/18/2020    K 4.4 10/18/2020     10/18/2020    CO2 27 10/18/2020    BUN 16 10/18/2020    LABALBU 4.4 10/18/2020    CREATININE 0.94 10/18/2020    CALCIUM 9.5 10/18/2020    GFRAA >60 10/18/2020    LABGLOM >60 10/18/2020    GLUCOSE 120 10/18/2020     LIVER PROFILE:  Recent Labs     10/18/20  0049   AST 55*   ALT 52*   LABALBU 4.4   ALKPHOS 51   BILITOT 0.85   PROT 7.2   ALBUMIN NOT REPORTED     FLP:    Lab Results   Component Value Date    CHOL 192 08/05/2016    TRIG 124 08/05/2016    HDL 62 10/07/2020    LDLCHOLESTEROL 92 10/07/2020     IMPRESSION    · Paroxysmal atrial flutter/fibrillation with RVR, improved with Cardizem gtt, [id number] =1, for age and he is on Aspirin  · History of ventricular tachycardia on monitor 2/2020, negative stress nuclear test 3/2020 and he remains free of angina  · Heavy alcohol use, daily  · Mildly elevated LFT's    RECOMMENDATIONS:     Continue current cardiac medications. Continue home Metoprolol and add Cardizem oral. Wean Cardizem gtt off for heart rate less than 100 bpm. Pt is on Aspirin for only due to low [id number] score. Importance of alcohol cessation was discussed. Okay to transfer to stepdown from cardiac standpoint. Management plan was discussed with patient, RN, and Dr. Adriana Tellez. Thank you for the consultation.     Electronically signed by DEYVI Loo CNP on 10/18/2020 at 7:55 AM     CC: Neema Robles MD

## 2020-10-18 NOTE — H&P
History and Physical/ admit note      CHIEF COMPLAINT: Palpitation    History of Present Illness: 82-year-old gentleman known history of atrial fibrillation comes in with palpitations with his heart rate up to 1 40-1 80 denies any dizziness no chest pain or shortness of breath no syncopal episode no fever no chills and no cough, patient is known to drink heavily        Past Medical History:   Diagnosis Date    Arthritis     Atrial fibrillation (HCC)     GERD (gastroesophageal reflux disease)     acid reflex    Hyperlipidemia          Past Surgical History:   Procedure Laterality Date    APPENDECTOMY      HERNIA REPAIR      NASAL SEPTUM SURGERY      NASAL SEPTUM SURGERY      when 23 yrs old    TONSILLECTOMY         Medications Prior to Admission:    Medications Prior to Admission: metoprolol tartrate (LOPRESSOR) 25 MG tablet, Take 25 mg by mouth 2 times daily  metoprolol succinate (TOPROL XL) 25 MG extended release tablet, Take 25 mg by mouth daily  ALPRAZolam (XANAX) 0.25 MG tablet, Take 0.25 mg by mouth 2 times daily as needed for Sleep. Turmeric (QC TUMERIC COMPLEX) 500 MG CAPS, Take 1 tablet by mouth daily  Multiple Vitamins-Minerals (THERAPEUTIC MULTIVITAMIN-MINERALS) tablet, Take 1 tablet by mouth daily  Melatonin 10 MG TABS, Take 10 mg by mouth nightly  Calcium-Magnesium-Vitamin D ER (CALCIUM 1200+D3) 600- MG-MG-UNIT TB24, Take 1 tablet by mouth daily  [DISCONTINUED] docusate sodium (COLACE) 100 MG capsule, Take 100 mg by mouth daily  fluticasone (FLONASE) 50 MCG/ACT nasal spray, 2 sprays by Nasal route daily  atorvastatin (LIPITOR) 20 MG tablet, Take 20 mg by mouth daily   aspirin 81 MG tablet, Take 81 mg by mouth daily   OMEPRAZOLE PO, Take 20 mg by mouth daily     Allergies:    Seasonal    Social History:    reports that he is a non-smoker but has been exposed to tobacco smoke.  He has never used smokeless tobacco. He reports current alcohol use of about 30.0 standard drinks of alcohol per week. He reports that he does not use drugs. Family History:   family history includes Cancer in his sister; Coronary Art Dis in his father and mother; Diabetes in his sister; Heart Attack in his father.     REVIEW OF SYSTEMS:    Constitutional: negative, No fever no chills  Eyes: negative  Ears, nose, mouth, throat, and face: negative  Respiratory: negative, No cough no shortness of breath no wheezing  Cardiovascular: negative, Potation no chest pain no syncope  Gastrointestinal: negative  Genitourinary:negative  Integument/breast: negative  Hematologic/lymphatic: negative  Musculoskeletal:negative  Neurological: negative  Behavioral/Psych: negative  Endocrine: negative  Allergic/Immunologic: negative  PHYSICAL EXAM:  General Appearance: alert and oriented to person, place and time and in no acute distress  Skin: warm and dry, no rash or erythema  Head: normocephalic and atraumatic  Eyes: pupils equal, round, and reactive to light, conjunctivae normal and sclera anicteric    Neck: neck supple and non tender without mass   Pulmonary/Chest: clear to auscultation bilaterally- no wheezes, rales or rhonchi, normal air movement, no respiratory distress  Cardiovascular: normal rate, normal S1 and S2, no gallops, intact distal pulses, no carotid bruits and irregularly irregular rhythm noted  Abdomen: soft, non-tender, non-distended, normal bowel sounds, no masses or organomegaly  Extremities: no edema and pulses no Homans' sign  Neurologic: Alert oriented x3 with no focal deficit    Vitals:  BP 99/80   Pulse 77   Temp 97.8 °F (36.6 °C) (Oral)   Resp 15   Ht 5' 11\" (1.803 m)   Wt 212 lb 11.9 oz (96.5 kg)   SpO2 92%   BMI 29.67 kg/m²       LABS:  CBC:   Lab Results   Component Value Date    WBC 6.5 10/18/2020    RBC 4.28 10/18/2020    HGB 14.4 10/18/2020    HCT 43.5 10/18/2020    .6 10/18/2020    MCH 33.6 10/18/2020    MCHC 33.1 10/18/2020    RDW 12.1 10/18/2020     10/18/2020    MPV 10.8 10/18/2020 CMP:    Lab Results   Component Value Date     10/18/2020    K 4.4 10/18/2020     10/18/2020    CO2 27 10/18/2020    BUN 16 10/18/2020    CREATININE 0.94 10/18/2020    GFRAA >60 10/18/2020    LABGLOM >60 10/18/2020    GLUCOSE 120 10/18/2020    PROT 7.2 10/18/2020    LABALBU 4.4 10/18/2020    CALCIUM 9.5 10/18/2020    BILITOT 0.85 10/18/2020    ALKPHOS 51 10/18/2020    AST 55 10/18/2020    ALT 52 10/18/2020         ASSESSMENT:    Fibrillation with rapid ventricular response low chads risk  Alcohol abuse  Renal insufficiency  Patient Active Problem List   Diagnosis    Atrial fibrillation/flutter (HCC)    Atrial fibrillation with RVR (HCC)       PLAN:    Labs reviewed IV fluids IV Cardizem cardiac enzymes T4 TSH 2D echo cardiology consultation start thiamine and folic acid watch for alcohol withdrawal              Darwin Chow MD  3:02 PM  10/18/2020

## 2020-10-18 NOTE — ED NOTES
Pt updated on plan of care. No s/s of distress. Respirations even and unlabored. Pt used bsc without assistance. Will continue to monitor.       Froilan Sanches RN  10/18/20 9701

## 2020-10-19 ENCOUNTER — HOSPITAL ENCOUNTER (INPATIENT)
Age: 70
LOS: 4 days | Discharge: HOME | DRG: 274 | End: 2020-10-23
Attending: FAMILY MEDICINE | Admitting: FAMILY MEDICINE
Payer: MEDICARE

## 2020-10-19 ENCOUNTER — APPOINTMENT (OUTPATIENT)
Dept: GENERAL RADIOLOGY | Age: 70
DRG: 274 | End: 2020-10-19
Payer: MEDICARE

## 2020-10-19 VITALS
TEMPERATURE: 97.7 F | RESPIRATION RATE: 16 BRPM | SYSTOLIC BLOOD PRESSURE: 129 MMHG | BODY MASS INDEX: 29.78 KG/M2 | OXYGEN SATURATION: 97 % | WEIGHT: 212.74 LBS | HEART RATE: 67 BPM | DIASTOLIC BLOOD PRESSURE: 76 MMHG | HEIGHT: 71 IN

## 2020-10-19 PROBLEM — I48.91 ATRIAL FIBRILLATION (HCC): Status: ACTIVE | Noted: 2020-10-19

## 2020-10-19 LAB
ABSOLUTE EOS #: 0.08 K/UL (ref 0–0.44)
ABSOLUTE EOS #: 0.11 K/UL (ref 0–0.44)
ABSOLUTE IMMATURE GRANULOCYTE: 0.01 K/UL (ref 0–0.3)
ABSOLUTE IMMATURE GRANULOCYTE: 0.01 K/UL (ref 0–0.3)
ABSOLUTE LYMPH #: 2.52 K/UL (ref 1.1–3.7)
ABSOLUTE LYMPH #: 2.53 K/UL (ref 1.1–3.7)
ABSOLUTE MONO #: 0.67 K/UL (ref 0.1–1.2)
ABSOLUTE MONO #: 0.69 K/UL (ref 0.1–1.2)
ALBUMIN SERPL-MCNC: 4.1 G/DL (ref 3.5–5.2)
ALBUMIN/GLOBULIN RATIO: ABNORMAL (ref 1–2.5)
ALP BLD-CCNC: 45 U/L (ref 40–129)
ALT SERPL-CCNC: 44 U/L (ref 5–41)
ANION GAP SERPL CALCULATED.3IONS-SCNC: 13 MMOL/L (ref 9–17)
ANION GAP SERPL CALCULATED.3IONS-SCNC: 7 MMOL/L (ref 9–17)
AST SERPL-CCNC: 38 U/L
BASOPHILS # BLD: 0 % (ref 0–2)
BASOPHILS # BLD: 1 % (ref 0–2)
BASOPHILS ABSOLUTE: 0.03 K/UL (ref 0–0.2)
BASOPHILS ABSOLUTE: 0.03 K/UL (ref 0–0.2)
BILIRUB SERPL-MCNC: 1.28 MG/DL (ref 0.3–1.2)
BUN BLDV-MCNC: 14 MG/DL (ref 8–23)
BUN BLDV-MCNC: 16 MG/DL (ref 8–23)
BUN/CREAT BLD: 14 (ref 9–20)
BUN/CREAT BLD: 17 (ref 9–20)
CALCIUM SERPL-MCNC: 10.2 MG/DL (ref 8.6–10.4)
CALCIUM SERPL-MCNC: 9.5 MG/DL (ref 8.6–10.4)
CHLORIDE BLD-SCNC: 103 MMOL/L (ref 98–107)
CHLORIDE BLD-SCNC: 104 MMOL/L (ref 98–107)
CO2: 23 MMOL/L (ref 20–31)
CO2: 28 MMOL/L (ref 20–31)
CREAT SERPL-MCNC: 0.93 MG/DL (ref 0.7–1.2)
CREAT SERPL-MCNC: 0.98 MG/DL (ref 0.7–1.2)
DIFFERENTIAL TYPE: ABNORMAL
DIFFERENTIAL TYPE: ABNORMAL
EKG ATRIAL RATE: 271 BPM
EKG P AXIS: -97 DEGREES
EKG Q-T INTERVAL: 280 MS
EKG QRS DURATION: 96 MS
EKG QTC CALCULATION (BAZETT): 400 MS
EKG R AXIS: 20 DEGREES
EKG T AXIS: 7 DEGREES
EKG VENTRICULAR RATE: 123 BPM
EOSINOPHILS RELATIVE PERCENT: 1 % (ref 1–4)
EOSINOPHILS RELATIVE PERCENT: 2 % (ref 1–4)
GFR AFRICAN AMERICAN: >60 ML/MIN
GFR AFRICAN AMERICAN: >60 ML/MIN
GFR NON-AFRICAN AMERICAN: >60 ML/MIN
GFR NON-AFRICAN AMERICAN: >60 ML/MIN
GFR SERPL CREATININE-BSD FRML MDRD: ABNORMAL ML/MIN/{1.73_M2}
GFR SERPL CREATININE-BSD FRML MDRD: ABNORMAL ML/MIN/{1.73_M2}
GFR SERPL CREATININE-BSD FRML MDRD: NORMAL ML/MIN/{1.73_M2}
GFR SERPL CREATININE-BSD FRML MDRD: NORMAL ML/MIN/{1.73_M2}
GLUCOSE BLD-MCNC: 125 MG/DL (ref 70–99)
GLUCOSE BLD-MCNC: 99 MG/DL (ref 70–99)
HCT VFR BLD CALC: 43.4 % (ref 40.7–50.3)
HCT VFR BLD CALC: 46.9 % (ref 40.7–50.3)
HEMOGLOBIN: 14.5 G/DL (ref 13–17)
HEMOGLOBIN: 15.9 G/DL (ref 13–17)
IMMATURE GRANULOCYTES: 0 %
IMMATURE GRANULOCYTES: 0 %
LYMPHOCYTES # BLD: 35 % (ref 24–43)
LYMPHOCYTES # BLD: 39 % (ref 24–43)
MCH RBC QN AUTO: 34 PG (ref 25.2–33.5)
MCH RBC QN AUTO: 34 PG (ref 25.2–33.5)
MCHC RBC AUTO-ENTMCNC: 33.4 G/DL (ref 28.4–34.8)
MCHC RBC AUTO-ENTMCNC: 33.9 G/DL (ref 28.4–34.8)
MCV RBC AUTO: 100.4 FL (ref 82.6–102.9)
MCV RBC AUTO: 101.6 FL (ref 82.6–102.9)
MONOCYTES # BLD: 11 % (ref 3–12)
MONOCYTES # BLD: 9 % (ref 3–12)
MYOGLOBIN: 46 NG/ML (ref 28–72)
NRBC AUTOMATED: 0 PER 100 WBC
NRBC AUTOMATED: 0 PER 100 WBC
PDW BLD-RTO: 12.1 % (ref 11.8–14.4)
PDW BLD-RTO: 12.3 % (ref 11.8–14.4)
PLATELET # BLD: 146 K/UL (ref 138–453)
PLATELET # BLD: 155 K/UL (ref 138–453)
PLATELET ESTIMATE: ABNORMAL
PLATELET ESTIMATE: ABNORMAL
PMV BLD AUTO: 11.7 FL (ref 8.1–13.5)
PMV BLD AUTO: 12.2 FL (ref 8.1–13.5)
POTASSIUM SERPL-SCNC: 4 MMOL/L (ref 3.7–5.3)
POTASSIUM SERPL-SCNC: 4.6 MMOL/L (ref 3.7–5.3)
RBC # BLD: 4.27 M/UL (ref 4.21–5.77)
RBC # BLD: 4.67 M/UL (ref 4.21–5.77)
RBC # BLD: ABNORMAL 10*6/UL
RBC # BLD: ABNORMAL 10*6/UL
SEG NEUTROPHILS: 47 % (ref 36–65)
SEG NEUTROPHILS: 55 % (ref 36–65)
SEGMENTED NEUTROPHILS ABSOLUTE COUNT: 3.12 K/UL (ref 1.5–8.1)
SEGMENTED NEUTROPHILS ABSOLUTE COUNT: 3.86 K/UL (ref 1.5–8.1)
SODIUM BLD-SCNC: 139 MMOL/L (ref 135–144)
SODIUM BLD-SCNC: 139 MMOL/L (ref 135–144)
TOTAL PROTEIN: 6.6 G/DL (ref 6.4–8.3)
TROPONIN INTERP: NORMAL
TROPONIN T: NORMAL NG/ML
TROPONIN, HIGH SENSITIVITY: 13 NG/L (ref 0–22)
TSH SERPL DL<=0.05 MIU/L-ACNC: 3.33 MIU/L (ref 0.3–5)
WBC # BLD: 6.5 K/UL (ref 3.5–11.3)
WBC # BLD: 7.2 K/UL (ref 3.5–11.3)
WBC # BLD: ABNORMAL 10*3/UL
WBC # BLD: ABNORMAL 10*3/UL

## 2020-10-19 PROCEDURE — 6370000000 HC RX 637 (ALT 250 FOR IP): Performed by: NURSE PRACTITIONER

## 2020-10-19 PROCEDURE — 99284 EMERGENCY DEPT VISIT MOD MDM: CPT

## 2020-10-19 PROCEDURE — 85025 COMPLETE CBC W/AUTO DIFF WBC: CPT

## 2020-10-19 PROCEDURE — 80053 COMPREHEN METABOLIC PANEL: CPT

## 2020-10-19 PROCEDURE — 71045 X-RAY EXAM CHEST 1 VIEW: CPT

## 2020-10-19 PROCEDURE — 36415 COLL VENOUS BLD VENIPUNCTURE: CPT

## 2020-10-19 PROCEDURE — 2500000003 HC RX 250 WO HCPCS: Performed by: NURSE PRACTITIONER

## 2020-10-19 PROCEDURE — 80048 BASIC METABOLIC PNL TOTAL CA: CPT

## 2020-10-19 PROCEDURE — 2580000003 HC RX 258: Performed by: INTERNAL MEDICINE

## 2020-10-19 PROCEDURE — 2060000000 HC ICU INTERMEDIATE R&B

## 2020-10-19 PROCEDURE — 93005 ELECTROCARDIOGRAM TRACING: CPT | Performed by: NURSE PRACTITIONER

## 2020-10-19 PROCEDURE — 96365 THER/PROPH/DIAG IV INF INIT: CPT

## 2020-10-19 PROCEDURE — 2580000003 HC RX 258: Performed by: NURSE PRACTITIONER

## 2020-10-19 PROCEDURE — 84443 ASSAY THYROID STIM HORMONE: CPT

## 2020-10-19 PROCEDURE — 6370000000 HC RX 637 (ALT 250 FOR IP): Performed by: INTERNAL MEDICINE

## 2020-10-19 PROCEDURE — 6360000002 HC RX W HCPCS: Performed by: INTERNAL MEDICINE

## 2020-10-19 PROCEDURE — 93010 ELECTROCARDIOGRAM REPORT: CPT | Performed by: INTERNAL MEDICINE

## 2020-10-19 PROCEDURE — 84484 ASSAY OF TROPONIN QUANT: CPT

## 2020-10-19 PROCEDURE — 83874 ASSAY OF MYOGLOBIN: CPT

## 2020-10-19 RX ORDER — FLUTICASONE PROPIONATE 50 MCG
1 SPRAY, SUSPENSION (ML) NASAL DAILY PRN
COMMUNITY

## 2020-10-19 RX ORDER — SODIUM CHLORIDE 0.9 % (FLUSH) 0.9 %
10 SYRINGE (ML) INJECTION PRN
Status: DISCONTINUED | OUTPATIENT
Start: 2020-10-19 | End: 2020-10-20 | Stop reason: SDUPTHER

## 2020-10-19 RX ORDER — SODIUM CHLORIDE 0.9 % (FLUSH) 0.9 %
10 SYRINGE (ML) INJECTION EVERY 12 HOURS SCHEDULED
Status: DISCONTINUED | OUTPATIENT
Start: 2020-10-20 | End: 2020-10-20 | Stop reason: SDUPTHER

## 2020-10-19 RX ORDER — ASPIRIN 81 MG/1
81 TABLET, CHEWABLE ORAL ONCE
Status: COMPLETED | OUTPATIENT
Start: 2020-10-20 | End: 2020-10-20

## 2020-10-19 RX ORDER — PSEUDOEPHEDRINE HCL 30 MG
100 TABLET ORAL DAILY PRN
Qty: 30 CAPSULE | Refills: 0 | Status: ON HOLD | OUTPATIENT
Start: 2020-10-19 | End: 2020-10-20

## 2020-10-19 RX ORDER — ACETAMINOPHEN 325 MG/1
650 TABLET ORAL EVERY 4 HOURS PRN
Status: DISCONTINUED | OUTPATIENT
Start: 2020-10-19 | End: 2020-10-20 | Stop reason: SDUPTHER

## 2020-10-19 RX ORDER — DILTIAZEM HYDROCHLORIDE 5 MG/ML
15 INJECTION INTRAVENOUS ONCE
Status: COMPLETED | OUTPATIENT
Start: 2020-10-19 | End: 2020-10-19

## 2020-10-19 RX ORDER — FLUTICASONE PROPIONATE 50 MCG
2 SPRAY, SUSPENSION (ML) NASAL DAILY PRN
Status: DISCONTINUED | OUTPATIENT
Start: 2020-10-19 | End: 2020-10-19 | Stop reason: HOSPADM

## 2020-10-19 RX ADMIN — Medication 1 TABLET: at 08:35

## 2020-10-19 RX ADMIN — MULTIPLE VITAMINS W/ MINERALS TAB 1 TABLET: TAB at 08:35

## 2020-10-19 RX ADMIN — ENOXAPARIN SODIUM 40 MG: 40 INJECTION SUBCUTANEOUS at 08:35

## 2020-10-19 RX ADMIN — DILTIAZEM HYDROCHLORIDE 180 MG: 180 CAPSULE, COATED, EXTENDED RELEASE ORAL at 08:35

## 2020-10-19 RX ADMIN — SODIUM CHLORIDE, PRESERVATIVE FREE 10 ML: 5 INJECTION INTRAVENOUS at 08:39

## 2020-10-19 RX ADMIN — ASPIRIN 81 MG: 81 TABLET, COATED ORAL at 08:35

## 2020-10-19 RX ADMIN — ACETAMINOPHEN 650 MG: 325 TABLET ORAL at 12:46

## 2020-10-19 RX ADMIN — DILTIAZEM HYDROCHLORIDE 15 MG: 5 INJECTION INTRAVENOUS at 22:50

## 2020-10-19 RX ADMIN — DILTIAZEM HYDROCHLORIDE 5 MG/HR: 5 INJECTION INTRAVENOUS at 22:51

## 2020-10-19 ASSESSMENT — ENCOUNTER SYMPTOMS
COUGH: 0
VOMITING: 0
NAUSEA: 0
COLOR CHANGE: 0
SHORTNESS OF BREATH: 0
SINUS PRESSURE: 0

## 2020-10-19 ASSESSMENT — PAIN SCALES - GENERAL
PAINLEVEL_OUTOF10: 1
PAINLEVEL_OUTOF10: 3

## 2020-10-19 NOTE — PROGRESS NOTES
[facility] Pharmacy Service   Medication Review    The patient's list of current home medications has been reviewed. Source(s) of information: patient, surescripts    Based on information provided by the above source(s), I have updated the patient's home med list as described below. I changed or updated the following medications on the patient's home medication list:  Discontinued Lopressor 25mg - alternative therapy      Added None      Adjusted   Omeprazole PO - added strength 20mg (1QD)  Flonase - added \"PRN allergies\" to sig     Other Notes None            Please feel free to call me with any questions about this encounter. Thank you. This note will be reviewed and co-signed by the [facility] Pharmacist. The pharmacist will review inpatient orders and contact the physician about any discrepancies. Martine Santos, pharmacy technician  [facility]  Phone:  [phone]  Fax: [phone]      Electronically signed by Martine Santos on 10/19/2020 at 2:35 PM     Prior to Admission medications    Medication Sig Start Date End Date Taking? Authorizing Provider   fluticasone (FLONASE) 50 MCG/ACT nasal spray 1 spray by Each Nostril route daily as needed for Allergies       metoprolol succinate (TOPROL XL) 25 MG extended release tablet Take 25 mg by mouth daily       ALPRAZolam (XANAX) 0.25 MG tablet Take 0.25 mg by mouth 2 times daily as needed for Sleep or Anxiety.         Turmeric (QC TUMERIC COMPLEX) 500 MG CAPS Take 1 tablet by mouth daily       Melatonin 10 MG TABS Take 10 mg by mouth nightly       Calcium-Magnesium-Vitamin D ER (CALCIUM 1200+D3) 600- MG-MG-UNIT TB24 Take 1 tablet by mouth daily       atorvastatin (LIPITOR) 20 MG tablet Take 20 mg by mouth daily        aspirin 81 MG tablet Take 81 mg by mouth daily        Multiple Vitamins-Minerals (THERAPEUTIC MULTIVITAMIN-MINERALS) tablet Take 1 tablet by mouth daily       Omeprazole 20 MG TBDD Take 20 mg by mouth daily

## 2020-10-19 NOTE — PROGRESS NOTES
[facility]   Section of Cardiology  Progress Note      Date:  10/19/2020  Patient: Chan Santiago  Admission:  10/18/2020 12:40 AM  Admit DX: Atrial fibrillation with RVR ([facility] [address] [I48.91]  Age:  78 y.o., 1950                           LOS: 1 day     Reason for evaluation:   Paroxysmal atrial fibrillation  Atrial flutter. SUBJECTIVE:     The patient was seen and examined. Notes and labs reviewed. There were not complications over night. He converted to normal sinus rhythm. Patient's cardiac review of systems: negative. The patient is generally feeling unchanged. OBJECTIVE:    BP 88/69   Pulse 95   Temp 97.7 °F (36.5 °C) (Oral)   Resp 16   Ht 5' 11\" (1.803 m)   Wt 212 lb 11.9 oz (96.5 kg)   SpO2 93%   BMI 29.67 kg/m²   No intake or output data in the 24 hours ending 10/19/20 4017    EXAM:   CONSTITUTIONAL:  awake, alert, cooperative, no apparent distress, and appears stated age. HEENT: Normal jugular venous pulsations, no carotid bruits. Head is atraumatic, normocephalic. Eyes and oral mucosa are normal.  LUNGS: Good respiratory effort. No for increased work of breathing. On auscultation: clear to auscultation bilaterally  CARDIOVASCULAR:  Normal apical impulse, irregular rate and rhythm, normal S1 and S2, no S3 or S4, and no murmur or rub noted. ABDOMEN: Soft, nontender, nondistended. Bowel sounds present. No masses or tenderness. SKIN: Warm and dry. EXTREMITIES: No lower extremity edema. Motor movement grossly intact. No cyanosis or clubbing.     Current Inpatient Medications:   sodium chloride flush  10 mL Intravenous 2 times per day    aspirin  81 mg Oral Daily    atorvastatin  20 mg Oral Nightly    calcium carb-cholecalciferol  1 tablet Oral Daily    melatonin  9 mg Oral Nightly    therapeutic multivitamin-minerals  1 tablet Oral Daily    metoprolol succinate  25 mg Oral Daily    dilTIAZem  180 mg Oral Daily    enoxaparin  40 mg Subcutaneous Daily    thiamine and folic acid IVPB   Intravenous Daily       IV Infusions (if any):   dilTIAZem (CARDIZEM) 125 mg in dextrose 5% 125 mL infusion Stopped (10/18/20 1330)       Diagnostics:   Telemetry: Sinus with premature atrial complexes. ECHO: Summary  Left ventricle is normal in size. Mild left ventricular hypertrophy. Global left ventricular systolic function is normal with an estimated  ejection fraction of 55 % . Due to the technical limitations of this study not all wall segments were  visualized. Mild aortic insufficiency. Thickened mitral valve leaflets. Trivial mitral regurgitation. No significant pericardial effusion is seen. .   Last stress test was normal.  Labs:   CBC:   Recent Labs     10/18/20  0634 10/19/20  0543   WBC 6.5 6.5   HGB 14.4 14.5   HCT 43.5 43.4    155     BMP:   Recent Labs     10/18/20  0634 10/19/20  0543    139   K 4.4 4.6   CO2 27 28   BUN 16 14   CREATININE 0.94 0.98   LABGLOM >60 >60   GLUCOSE 120* 125*     BNP: No results for input(s): BNP in the last 72 hours. PT/INR: No results for input(s): PROTIME, INR in the last 72 hours. APTT:No results for input(s): APTT in the last 72 hours. CARDIAC ENZYMES:No results for input(s): CKTOTAL, CKMB, CKMBINDEX, TROPONINI in the last 72 hours. FASTING LIPID PANEL:  Lab Results   Component Value Date    HDL 62 10/07/2020    TRIG 124 08/05/2016     LIVER PROFILE:  Recent Labs     10/18/20  0049 10/19/20  0543   AST 55* 38   ALT 52* 44*   LABALBU 4.4 4.1       ASSESSMENT:    Patient Active Problem List   Diagnosis    Atrial fibrillation/flutter (HCC)    Atrial fibrillation with RVR (HCC)       PLAN:    1. Paroxysmal atrial fibrillation  2. Atrial flutter  Patient is converted to normal sinus rhythm. Ok to discharge home from a cardiology standpoint. He will continue his home dose of metoprolol 25mg po bid. Discontinue diltiazem. He may need eventual rhythm control with sotalol multaq etc or EPS and ablation.  He will follow up with me in 2m.  Please see orders. Discussed with patient and nursing.     Clyde Knight MD

## 2020-10-19 NOTE — PLAN OF CARE
Problem: Cardiac Output - Decreased:  Goal: Hemodynamic stability will improve  Description: Hemodynamic stability will improve  10/18/2020 2310 by Davide Garg RN  Outcome: Ongoing  10/18/2020 1544 by Nery Nair RN  Outcome: Ongoing

## 2020-10-19 NOTE — PROGRESS NOTES
Subjective:    Atrial fibrillation  Patient fairly well his rate is controlled denies any palpitation no chest pain no shortness of breath  ROS  Fever no chills no GI/ complaints no cardiopulmonary complaints no TIA no bleeding no headache no sore throat no skin lesions no polyuria polydipsia or hypoglycemia  physical exam  General Appearance: alert and oriented to person, place and time and in no acute distress  Skin: warm and dry, no rash or erythema  Head: normocephalic and atraumatic  Eyes: conjunctivae normal and sclera anicteric  Neck: neck supple and non tender without mass   Pulmonary/Chest: clear to auscultation bilaterally- no wheezes, rales or rhonchi, normal air movement, no respiratory distress  Cardiovascular: normal rate, regular rhythm, normal S1 and S2, no gallops, intact distal pulses and no carotid bruits  Abdomen: soft, non-tender, non-distended, normal bowel sounds, no masses or organomegaly  Extremities: no edema and good pulses no Homans' sign    Neurologic: Alert oriented x3 with no focal deficit    /76   Pulse 67   Temp 97.7 °F (36.5 °C) (Oral)   Resp 16   Ht 5' 11\" (1.803 m)   Wt 212 lb 11.9 oz (96.5 kg)   SpO2 97%   BMI 29.67 kg/m²     CBC:   Lab Results   Component Value Date    WBC 6.5 10/19/2020    RBC 4.27 10/19/2020    HGB 14.5 10/19/2020    HCT 43.4 10/19/2020    .6 10/19/2020    MCH 34.0 10/19/2020    MCHC 33.4 10/19/2020    RDW 12.3 10/19/2020     10/19/2020    MPV 11.7 10/19/2020     BMP:    Lab Results   Component Value Date     10/19/2020    K 4.6 10/19/2020     10/19/2020    CO2 28 10/19/2020    BUN 14 10/19/2020    LABALBU 4.1 10/19/2020    CREATININE 0.98 10/19/2020    CALCIUM 9.5 10/19/2020    GFRAA >60 10/19/2020    LABGLOM >60 10/19/2020    GLUCOSE 125 10/19/2020        Assessment:  Patient Active Problem List   Diagnosis    Atrial fibrillation/flutter ([address]    Atrial fibrillation with RVR (HCC)     Atrial fibrillation with rapid ventricular response  Alcohol abuse  Renal insufficiency  Plan:  Meds labs are reviewed continue with present treatment due to his risk of strokes is very low he is currently on aspirin we will follow with cardiology advised to cut down and quit alcohol and also to avoid nephrotoxic drugs he needs close follow-up for his renal insufficiency to be evaluated as an outpatient              Candy Gao MD  6:20 PM

## 2020-10-19 NOTE — PLAN OF CARE
Problem: Falls - Risk of:  Goal: Will remain free from falls  Description: Will remain free from falls  Outcome: Ongoing  Patient is fall risk per fall scale. Falling star on door. Fall sticker on armband. Hourly rounding performed. Personal belongings and call light within reach. Bed in low position. Problem: Cardiac Output - Decreased:  Goal: Hemodynamic stability will improve  Description: Hemodynamic stability will improve  10/19/2020 0842 by Harriett Queen RN  Outcome: Ongoing   No edema present. Mucous membranes moist. Tolerates fluids. Iv fluids per orders. Will continue to monitor.

## 2020-10-20 LAB
EKG ATRIAL RATE: 266 BPM
EKG P AXIS: -109 DEGREES
EKG Q-T INTERVAL: 290 MS
EKG QRS DURATION: 146 MS
EKG QTC CALCULATION (BAZETT): 431 MS
EKG R AXIS: 35 DEGREES
EKG T AXIS: 14 DEGREES
EKG VENTRICULAR RATE: 133 BPM
MYOGLOBIN: 41 NG/ML (ref 28–72)
TROPONIN INTERP: NORMAL
TROPONIN T: NORMAL NG/ML
TROPONIN, HIGH SENSITIVITY: 14 NG/L (ref 0–22)

## 2020-10-20 PROCEDURE — 93010 ELECTROCARDIOGRAM REPORT: CPT | Performed by: INTERNAL MEDICINE

## 2020-10-20 PROCEDURE — 97116 GAIT TRAINING THERAPY: CPT

## 2020-10-20 PROCEDURE — 2580000003 HC RX 258: Performed by: EMERGENCY MEDICINE

## 2020-10-20 PROCEDURE — 84484 ASSAY OF TROPONIN QUANT: CPT

## 2020-10-20 PROCEDURE — 6370000000 HC RX 637 (ALT 250 FOR IP): Performed by: FAMILY MEDICINE

## 2020-10-20 PROCEDURE — 36415 COLL VENOUS BLD VENIPUNCTURE: CPT

## 2020-10-20 PROCEDURE — 2580000003 HC RX 258: Performed by: FAMILY MEDICINE

## 2020-10-20 PROCEDURE — 6370000000 HC RX 637 (ALT 250 FOR IP): Performed by: INTERNAL MEDICINE

## 2020-10-20 PROCEDURE — 97161 PT EVAL LOW COMPLEX 20 MIN: CPT

## 2020-10-20 PROCEDURE — 6370000000 HC RX 637 (ALT 250 FOR IP): Performed by: EMERGENCY MEDICINE

## 2020-10-20 PROCEDURE — 97530 THERAPEUTIC ACTIVITIES: CPT

## 2020-10-20 PROCEDURE — 2060000000 HC ICU INTERMEDIATE R&B

## 2020-10-20 PROCEDURE — 83874 ASSAY OF MYOGLOBIN: CPT

## 2020-10-20 RX ORDER — METOPROLOL SUCCINATE 25 MG/1
25 TABLET, EXTENDED RELEASE ORAL DAILY
Status: DISCONTINUED | OUTPATIENT
Start: 2020-10-20 | End: 2020-10-20

## 2020-10-20 RX ORDER — SODIUM CHLORIDE 0.9 % (FLUSH) 0.9 %
10 SYRINGE (ML) INJECTION EVERY 12 HOURS SCHEDULED
Status: DISCONTINUED | OUTPATIENT
Start: 2020-10-20 | End: 2020-10-23 | Stop reason: HOSPADM

## 2020-10-20 RX ORDER — MAGNESIUM SULFATE 1 G/100ML
1 INJECTION INTRAVENOUS PRN
Status: DISCONTINUED | OUTPATIENT
Start: 2020-10-20 | End: 2020-10-23 | Stop reason: HOSPADM

## 2020-10-20 RX ORDER — ACETAMINOPHEN 325 MG/1
650 TABLET ORAL EVERY 6 HOURS PRN
Status: DISCONTINUED | OUTPATIENT
Start: 2020-10-20 | End: 2020-10-22 | Stop reason: SDUPTHER

## 2020-10-20 RX ORDER — METOPROLOL SUCCINATE 25 MG/1
25 TABLET, EXTENDED RELEASE ORAL 2 TIMES DAILY
Status: DISCONTINUED | OUTPATIENT
Start: 2020-10-20 | End: 2020-10-20

## 2020-10-20 RX ORDER — ATORVASTATIN CALCIUM 20 MG/1
20 TABLET, FILM COATED ORAL NIGHTLY
Status: DISCONTINUED | OUTPATIENT
Start: 2020-10-20 | End: 2020-10-23 | Stop reason: HOSPADM

## 2020-10-20 RX ORDER — FAMOTIDINE 20 MG/1
20 TABLET, FILM COATED ORAL 2 TIMES DAILY
Status: DISCONTINUED | OUTPATIENT
Start: 2020-10-20 | End: 2020-10-23 | Stop reason: HOSPADM

## 2020-10-20 RX ORDER — ACETAMINOPHEN 650 MG/1
650 SUPPOSITORY RECTAL EVERY 6 HOURS PRN
Status: DISCONTINUED | OUTPATIENT
Start: 2020-10-20 | End: 2020-10-22 | Stop reason: SDUPTHER

## 2020-10-20 RX ORDER — POTASSIUM CHLORIDE 20 MEQ/1
40 TABLET, EXTENDED RELEASE ORAL PRN
Status: DISCONTINUED | OUTPATIENT
Start: 2020-10-20 | End: 2020-10-23 | Stop reason: HOSPADM

## 2020-10-20 RX ORDER — NICOTINE 21 MG/24HR
1 PATCH, TRANSDERMAL 24 HOURS TRANSDERMAL DAILY PRN
Status: DISCONTINUED | OUTPATIENT
Start: 2020-10-20 | End: 2020-10-23 | Stop reason: HOSPADM

## 2020-10-20 RX ORDER — LANOLIN ALCOHOL/MO/W.PET/CERES
9 CREAM (GRAM) TOPICAL NIGHTLY
Status: DISCONTINUED | OUTPATIENT
Start: 2020-10-20 | End: 2020-10-23 | Stop reason: HOSPADM

## 2020-10-20 RX ORDER — ASPIRIN 81 MG/1
81 TABLET ORAL DAILY
Status: DISCONTINUED | OUTPATIENT
Start: 2020-10-20 | End: 2020-10-23 | Stop reason: HOSPADM

## 2020-10-20 RX ORDER — ALPRAZOLAM 0.25 MG/1
0.25 TABLET ORAL 2 TIMES DAILY PRN
Status: DISCONTINUED | OUTPATIENT
Start: 2020-10-20 | End: 2020-10-23 | Stop reason: HOSPADM

## 2020-10-20 RX ORDER — POTASSIUM CHLORIDE 7.45 MG/ML
10 INJECTION INTRAVENOUS PRN
Status: DISCONTINUED | OUTPATIENT
Start: 2020-10-20 | End: 2020-10-23 | Stop reason: HOSPADM

## 2020-10-20 RX ORDER — ONDANSETRON 2 MG/ML
4 INJECTION INTRAMUSCULAR; INTRAVENOUS EVERY 6 HOURS PRN
Status: DISCONTINUED | OUTPATIENT
Start: 2020-10-20 | End: 2020-10-23 | Stop reason: HOSPADM

## 2020-10-20 RX ORDER — PROMETHAZINE HYDROCHLORIDE 12.5 MG/1
12.5 TABLET ORAL EVERY 6 HOURS PRN
Status: DISCONTINUED | OUTPATIENT
Start: 2020-10-20 | End: 2020-10-23 | Stop reason: HOSPADM

## 2020-10-20 RX ORDER — SODIUM CHLORIDE 0.9 % (FLUSH) 0.9 %
10 SYRINGE (ML) INJECTION PRN
Status: DISCONTINUED | OUTPATIENT
Start: 2020-10-20 | End: 2020-10-23 | Stop reason: HOSPADM

## 2020-10-20 RX ADMIN — METOPROLOL SUCCINATE 25 MG: 25 TABLET, EXTENDED RELEASE ORAL at 11:57

## 2020-10-20 RX ADMIN — METOPROLOL TARTRATE 25 MG: 25 TABLET, FILM COATED ORAL at 21:16

## 2020-10-20 RX ADMIN — ACETAMINOPHEN 650 MG: 325 TABLET ORAL at 09:36

## 2020-10-20 RX ADMIN — SODIUM CHLORIDE, PRESERVATIVE FREE 10 ML: 5 INJECTION INTRAVENOUS at 21:17

## 2020-10-20 RX ADMIN — ATORVASTATIN CALCIUM 20 MG: 20 TABLET, FILM COATED ORAL at 21:16

## 2020-10-20 RX ADMIN — SODIUM CHLORIDE, PRESERVATIVE FREE 10 ML: 5 INJECTION INTRAVENOUS at 11:59

## 2020-10-20 RX ADMIN — ASPIRIN 81 MG: 81 TABLET, COATED ORAL at 11:57

## 2020-10-20 RX ADMIN — ASPIRIN 81 MG: 81 TABLET, CHEWABLE ORAL at 06:16

## 2020-10-20 RX ADMIN — FAMOTIDINE 20 MG: 20 TABLET ORAL at 21:16

## 2020-10-20 RX ADMIN — FAMOTIDINE 20 MG: 20 TABLET ORAL at 11:57

## 2020-10-20 RX ADMIN — MELATONIN TAB 3 MG 9 MG: 3 TAB at 21:16

## 2020-10-20 RX ADMIN — Medication 10 ML: at 09:36

## 2020-10-20 ASSESSMENT — PAIN - FUNCTIONAL ASSESSMENT
PAIN_FUNCTIONAL_ASSESSMENT: ACTIVITIES ARE NOT PREVENTED

## 2020-10-20 ASSESSMENT — PAIN DESCRIPTION - LOCATION
LOCATION: HEAD

## 2020-10-20 ASSESSMENT — PAIN DESCRIPTION - FREQUENCY
FREQUENCY: CONTINUOUS

## 2020-10-20 ASSESSMENT — PAIN DESCRIPTION - ONSET
ONSET: ON-GOING

## 2020-10-20 ASSESSMENT — PAIN SCALES - GENERAL
PAINLEVEL_OUTOF10: 0
PAINLEVEL_OUTOF10: 2
PAINLEVEL_OUTOF10: 2
PAINLEVEL_OUTOF10: 0
PAINLEVEL_OUTOF10: 0

## 2020-10-20 ASSESSMENT — PAIN DESCRIPTION - PAIN TYPE
TYPE: ACUTE PAIN

## 2020-10-20 ASSESSMENT — PAIN DESCRIPTION - DESCRIPTORS
DESCRIPTORS: HEADACHE

## 2020-10-20 ASSESSMENT — PAIN DESCRIPTION - PROGRESSION: CLINICAL_PROGRESSION: GRADUALLY IMPROVING

## 2020-10-20 NOTE — ED NOTES
Dr. Jaziel Pride talked to by Tamie Channel on phone. Writer notified that Dr. Sara Kulkarni will put the rest of the orders in in the morning.      Melina Parr RN  10/20/20 0000

## 2020-10-20 NOTE — ED NOTES
Pt moved to hospital bed in private room on portable telemetry.       Modesto Mendoza RN  10/19/20 0570

## 2020-10-20 NOTE — FLOWSHEET NOTE
Patient arrived to room 2036 via wheelchair from ED. Patient is alert and oriented and denies pain and denies palpitations. Bed locked and placed in lowest position. Side rails up x2. Call light placed at the patient's bedside. Telephone report completed prior. Pulses palpable bilaterally. Vital signs obtained (see flowsheet). RN educated the patient on plan of care. Patient verbalizes understanding. Will continue to monitor closely.

## 2020-10-20 NOTE — PROGRESS NOTES
Transitions of Care Pharmacy Service   Medication Review    The patient's list of current home medications has been reviewed. Source(s) of information: Patient Surescripts    Based on information provided by the above source(s), I have updated the patient's home med list as described below. Please review the ACTION REQUESTED section of this note below for any discrepancies on current hospital orders. I changed or updated the following medications on the patient's home medication list:  Removed Docusate 100mg PO daily prn - pt choice     Added none     Adjusted   none   Other Notes none         PROVIDER ACTION REQUESTED  Medications that need to be addressed by a physician/nurse practitioner:    Medication Action Requested        none         Please feel free to call me with any questions about this encounter. Thank you. Liliya Roca [facility]   Transitions of Care Pharmacy Service  Phone:  [phone]  Fax: [phone]      Electronically signed by Liliya Roca [facility] on 10/20/2020 at 10:42 AM           Medications Prior to Admission: fluticasone (FLONASE) 50 MCG/ACT nasal spray, 1 spray by Each Nostril route daily as needed for Allergies  ALPRAZolam (XANAX) 0.25 MG tablet, Take 0.25 mg by mouth 2 times daily as needed for Sleep or Anxiety.    Turmeric (QC TUMERIC COMPLEX) 500 MG CAPS, Take 1 tablet by mouth daily  Multiple Vitamins-Minerals (THERAPEUTIC MULTIVITAMIN-MINERALS) tablet, Take 1 tablet by mouth daily  Melatonin 10 MG TABS, Take 10 mg by mouth nightly  Calcium-Magnesium-Vitamin D ER (CALCIUM 1200+D3) 600- MG-MG-UNIT TB24, Take 1 tablet by mouth daily  omeprazole 20 MG EC tablet, Take 20 mg by mouth daily   metoprolol succinate (TOPROL XL) 25 MG extended release tablet, Take 25 mg by mouth daily  atorvastatin (LIPITOR) 20 MG tablet, Take 20 mg by mouth daily   aspirin 81 MG tablet, Take 81 mg by mouth daily

## 2020-10-20 NOTE — ED PROVIDER NOTES
EMERGENCY DEPARTMENT ENCOUNTER   ATTENDING ATTESTATION     Pt Name: Alexi Barreto  MRN: [id number]  [place] 1950  Date of evaluation: 10/19/20   Alexi Barreto is a 78 y.o. male with CC: Palpitations    MDM:   Patient is a 74-year-old male, I admitted him 2 days ago for A. fib with RVR, subsequently discharged earlier this evening who returns to the ED in A. fib with a heart rate in the 130s. Patient was lying in bed at home, he checked his heart rate which was elevated and he called EMS. No chest pain, shortness of breath. There is discussions for cardiac ablation. Patient will be admitted for rate control. CRITICAL CARE:       EKG: All EKG's are interpreted by the Emergency Department Physician who either signs or Co-signs this chart in the absence of a cardiologist.      RADIOLOGY:All plain film, CT, MRI, and formal ultrasound images (except ED bedside ultrasound) are read by the radiologist, see reports below, unless otherwise noted in MDM or here. XR CHEST PORTABLE    (Results Pending)     LABS: All lab results were reviewed by myself, and all abnormals are listed below. Labs Reviewed   CBC WITH AUTO DIFFERENTIAL   BASIC METABOLIC PANEL   TROP/MYOGLOBIN   TROP/MYOGLOBIN     CONSULTS:  None  FINAL IMPRESSION    No diagnosis found. PASTMEDICAL HISTORY     Past Medical History:   Diagnosis Date    Arthritis     Atrial fibrillation (HCC)     GERD (gastroesophageal reflux disease)     acid reflex    Hyperlipidemia      SURGICAL HISTORY       Past Surgical History:   Procedure Laterality Date    APPENDECTOMY      HERNIA REPAIR      NASAL SEPTUM SURGERY      NASAL SEPTUM SURGERY      when 23 yrs old    TONSILLECTOMY       CURRENT MEDICATIONS       Previous Medications    ALPRAZOLAM (XANAX) 0.25 MG TABLET    Take 0.25 mg by mouth 2 times daily as needed for Sleep or Anxiety.      ASPIRIN 81 MG TABLET    Take 81 mg by mouth daily     ATORVASTATIN (LIPITOR) 20 MG TABLET    Take 20 mg by mouth daily     CALCIUM-MAGNESIUM-VITAMIN D ER (CALCIUM 1200+D3) 600- MG-MG-UNIT TB24    Take 1 tablet by mouth daily    DOCUSATE SODIUM (COLACE, DULCOLAX) 100 MG CAPS    Take 100 mg by mouth daily as needed for Constipation    FLUTICASONE (FLONASE) 50 MCG/ACT NASAL SPRAY    1 spray by Each Nostril route daily as needed for Allergies    MELATONIN 10 MG TABS    Take 10 mg by mouth nightly    METOPROLOL SUCCINATE (TOPROL XL) 25 MG EXTENDED RELEASE TABLET    Take 25 mg by mouth daily    MULTIPLE VITAMINS-MINERALS (THERAPEUTIC MULTIVITAMIN-MINERALS) TABLET    Take 1 tablet by mouth daily    OMEPRAZOLE 20 MG EC TABLET    Take 20 mg by mouth daily     TURMERIC (QC TUMERIC COMPLEX) 500 MG CAPS    Take 1 tablet by mouth daily     ALLERGIES     is allergic to seasonal.  FAMILY HISTORY     He indicated that his mother is alive. He indicated that his father is . He indicated that his sister is alive. SOCIAL HISTORY       Social History     Tobacco Use    Smoking status: Passive Smoke Exposure - Never Smoker    Smokeless tobacco: Never Used   Substance Use Topics    Alcohol use: Yes     Alcohol/week: 30.0 standard drinks     Types: 30 Cans of beer per week     Comment: 30 beers per week, occ scotch or tequila    Drug use: No       I personally evaluated and examined the patient in conjunction with the APC and agree with the assessment, treatment plan, and disposition of the patient as recorded by the APC.    Carlos Lynch MD  Attending Emergency Physician            Marnie Tristan MD  10/19/20 6279       Marnie Tristan MD  10/20/20 1782

## 2020-10-20 NOTE — PROGRESS NOTES
Physical Therapy    Facility/Department: [facility] CVICU  Initial Assessment & tx only    NAME: Jose L Aguirre  : 1950  MRN: [id number]    Date of Service: 10/20/2020    Discharge Recommendations:  Home independently, Home with assist PRN   PT Equipment Recommendations  Equipment Needed: No    Assessment   Assessment: Pt tolerated PT session well, not requiring PT follow up. Will sign off this date. Prognosis: Excellent  Decision Making: Low Complexity  Clinical Presentation: stable  PT Education: PT Role;General Safety;Gait Training  No Skilled PT: Independent with functional mobility   REQUIRES PT FOLLOW UP: No  Activity Tolerance  Activity Tolerance: Patient Tolerated treatment well  Activity Tolerance: Monitored HR throughout. Patient Diagnosis(es): The encounter diagnosis was Atrial fibrillation, unspecified type ([address] has a past medical history of Arthritis, Atrial fibrillation ([address] GERD (gastroesophageal reflux disease), and Hyperlipidemia. has a past surgical history that includes Appendectomy; Tonsillectomy; hernia repair; Nasal septum surgery; and Nasal septum surgery. Restrictions  Restrictions/Precautions  Restrictions/Precautions: Up as Tolerated  Position Activity Restriction  Other position/activity restrictions: up with assist, monitor HR, telemetry  Vision/Hearing  Vision: Impaired  Vision Exceptions: Wears glasses for reading(wears for driving)  Hearing: Within functional limits     Subjective  General  Chart Reviewed: Yes  Patient assessed for rehabilitation services?: Yes  Response To Previous Treatment: Not applicable  Family / Caregiver Present: No  Follows Commands: Within Functional Limits  Subjective  Subjective: Pt states he's not in any pain, agreeable to work with PT.   Pain Screening  Patient Currently in Pain: Denies          Orientation  Orientation  Overall Orientation Status: Within Normal Limits  Social/Functional History  Social/Functional History  Lives With: Spouse  Type of Home: House  Home Layout: One level  Home Access: Stairs to enter with rails  Entrance Stairs - Number of Steps: 3  Entrance Stairs - Rails: Both  Bathroom Shower/Tub: Tub/Shower unit  Bathroom Toilet: Standard  Bathroom Equipment: Shower chair(wife uses shower chair)  [facility] [address] [address] [place] Help From: Family  ADL Assistance: Independent  Homemaking Assistance: Independent  Homemaking Responsibilities: Yes  Ambulation Assistance: Independent  Transfer Assistance: Independent  Active : Yes  Mode of Transportation: Car  Occupation: Works at home  Type of occupation: software tech  Leisure & Hobbies: golf, garden  Additional Comments: tripped outside, no dizziness or off balance  Cognition   Cognition  Overall Cognitive Status: WFL    Objective     Observation/Palpation  Posture: Good  Observation: Pt lying in bed dressed, hooked to telemetry    AROM RLE (degrees)  RLE AROM: WFL  AROM LLE (degrees)  LLE AROM : WFL  AROM RUE (degrees)  RUE AROM : WFL  AROM LUE (degrees)  LUE AROM : WFL  Strength RLE  Strength RLE: WFL  Strength LLE  Strength LLE: WFL  Strength RUE  Strength RUE: WFL  Strength LUE  Strength LUE: WFL  Tone RLE  RLE Tone: Normotonic  Tone LLE  LLE Tone: Normotonic  Sensation  Overall Sensation Status: WFL  Bed mobility  Supine to Sit: Independent  Sit to Supine: Independent  Scooting: Independent  Transfers  Sit to Stand: Independent  Stand to sit: Independent  Bed to Chair: Independent  Ambulation  Ambulation?: Yes  Ambulation 1  Surface: level tile  Device: No Device  Assistance: Supervision  Distance: 200ft  Comments: No LOB, HR monitored throughout staying WNL.   Stairs/Curb  Stairs?: Yes  Stairs  # Steps : 4  Stairs Height: 4\"  Rails: Right ascending  Device: No Device  Assistance: Supervision  Comment: Good use of handrail, alternating pattern ascending and descending     Balance  Posture: Good  Sitting - Static: Good  Sitting - Dynamic: Good  Standing - Static: Good  Standing - Dynamic: Good        Plan   Plan  Times per week: Pt does not require PT follow up - indep with all mobility  Safety Devices  Type of devices: Nurse notified, Left in chair      OutComes Score  AM-PAC Score  AM-PAC Inpatient Mobility Raw Score : 24 (10/20/20 1459)  AM-PAC Inpatient T-Scale Score : 61.14 (10/20/20 1459)  Mobility Inpatient CMS 0-100% Score: 0 (10/20/20 1459)  Mobility Inpatient CMS G-Code Modifier : [address] (10/20/20 1459)          Goals  Short term goals  Time Frame for Short term goals: No goals required as pt does not need skilled PT at this time.        Therapy Time   Individual Concurrent Group Co-treatment   Time In 9033         Time Out 1340         Minutes 35          tx time: 25mins       Francia Holter, PT

## 2020-10-20 NOTE — CONSULTS
[facility] Cardiology   Consult Note             Date:   10/20/2020  Patient name: Vickie Gan  Date of admission:  10/19/2020 10:31 PM  MRN:   [id number]  YOB: 1950    Reason for Admission:  Atrial fibrillation and flutter. CHIEF COMPLAINT:  Palpitations. History Obtained From:  patient, electronic medical record    HISTORY OF PRESENT ILLNESS:      The patient is a 78 y.o.  male who is admitted to the hospital for atrial fibrillation. He was recently discharged from the hospital with atrial fibrillation. After discharge when he went home, he went straight into atrial fibrillation with rapid ventricular response. He is therefore here for further management. He converted to sinus rhythm with diltiazem drip. Past Medical History:   has a past medical history of Arthritis, Atrial fibrillation ([address] GERD (gastroesophageal reflux disease), and Hyperlipidemia. Past Surgical History:   has a past surgical history that includes Appendectomy; Tonsillectomy; hernia repair; Nasal septum surgery; and Nasal septum surgery. Home Medications:    Prior to Admission medications    Medication Sig Start Date End Date Taking? Authorizing Provider   fluticasone (FLONASE) 50 MCG/ACT nasal spray 1 spray by Each Nostril route daily as needed for Allergies   Yes Historical Provider, MD   ALPRAZolam (XANAX) 0.25 MG tablet Take 0.25 mg by mouth 2 times daily as needed for Sleep or Anxiety.     Yes Historical Provider, MD   Turmeric (QC TUMERIC COMPLEX) 500 MG CAPS Take 1 tablet by mouth daily   Yes Historical Provider, MD   Multiple Vitamins-Minerals (THERAPEUTIC MULTIVITAMIN-MINERALS) tablet Take 1 tablet by mouth daily   Yes Historical Provider, MD   Melatonin 10 MG TABS Take 10 mg by mouth nightly   Yes Historical Provider, MD   Calcium-Magnesium-Vitamin D ER (CALCIUM 1200+D3) 600- MG-MG-UNIT TB24 Take 1 tablet by mouth daily   Yes Historical Provider, MD   omeprazole 20 MG EC tablet Take 20 mg by mouth daily    Yes Historical Provider, MD   metoprolol succinate (TOPROL XL) 25 MG extended release tablet Take 25 mg by mouth daily    Historical Provider, MD   atorvastatin (LIPITOR) 20 MG tablet Take 20 mg by mouth daily  10/26/17   Historical Provider, MD   aspirin 81 MG tablet Take 81 mg by mouth daily     Historical Provider, MD       Allergies:  Seasonal    Social History:   reports that he is a non-smoker but has been exposed to tobacco smoke. He has never used smokeless tobacco. He reports current alcohol use of about 30.0 standard drinks of alcohol per week. He reports that he does not use drugs. Family History: family history includes Cancer in his sister; Coronary Art Dis in his father and mother; Diabetes in his sister; Heart Attack in his father. REVIEW OF SYSTEMS:    · Constitutional: there has been no unanticipated weight loss. There's been No change in energy level, No change in activity level. · Eyes: No visual changes or diplopia. No scleral icterus. · ENT: No Headaches, hearing loss or vertigo. No mouth sores or sore throat. · Cardiovascular: No chest pain, No dyspnea on exertion, Yes palpitations or No loss of consciousness. No cough, hemoptysis, No pleuritic pain, or phlebitis. · Respiratory: No cough or wheezing, no sputum production. No hematemesis. · Gastrointestinal: No abdominal pain, appetite loss, blood in stools. No change in bowel or bladder habits. · Genitourinary: No dysuria, trouble voiding, or hematuria. · Musculoskeletal:  No gait disturbance, No weakness or joint complaints. · Integumentary: No rash or pruritis. · Neurological: No headache, diplopia, change in muscle strength, numbness or tingling. No change in gait, balance, coordination, mood, affect, memory, mentation, behavior. · Psychiatric: No anxiety, or depression. · Endocrine: No temperature intolerance. No excessive thirst, fluid intake, or urination.  No tremor. · Hematologic/Lymphatic: No abnormal bruising or bleeding, blood clots or swollen lymph nodes. · Allergic/Immunologic: No nasal congestion or hives. PHYSICAL EXAM:    Physical Examination:    BP (!) 143/86   Pulse 61   Temp 97.2 °F (36.2 °C) (Temporal)   Resp 18   Ht 5' 11\" (1.803 m)   Wt 207 lb 6.4 oz (94.1 kg)   SpO2 100%   BMI 28.93 kg/m²    Constitutional and General Appearance: alert, cooperative, no distress and appears stated age  HEENT: PERRL, no cervical lymphadenopathy. No masses palpable. Normal oral mucosa  Respiratory:  · Normal excursion and expansion without use of accessory muscles  · Resp Auscultation: Good respiratory effort. No for increased work of breathing. On auscultation: clear to auscultation bilaterally  Cardiovascular:  · The apical impulse is not displaced  · Heart tones are crisp and normal. regular S1 and S2.  · Jugular venous pulsation Normal  · The carotid upstroke is normal in amplitude and contour without delay or bruit  · Peripheral pulses are symmetrical and full   Abdomen:  · No masses or tenderness  · Bowel sounds present  Extremities:  ·  No Cyanosis or Clubbing  ·  Lower extremity edema: No  ·  Skin: Warm and dry  Neurological:  · Alert and oriented. · Moves all extremities well  · No abnormalities of mood, affect, memory, mentation, or behavior are noted    DATA:    Diagnostics:      EKG: normal EKG, normal sinus rhythm, unchanged from previous tracings. ECHO: reviewed. Ejection fraction: 60%. Labs:     CBC:   Recent Labs     10/19/20  0543 10/19/20  2240   WBC 6.5 7.2   HGB 14.5 15.9   HCT 43.4 46.9    146     BMP:   Recent Labs     10/19/20  0543 10/19/20  2240    139   K 4.6 4.0   CO2 28 23   BUN 14 16   CREATININE 0.98 0.93   LABGLOM >60 >60   GLUCOSE 125* 99     BNP: No results for input(s): BNP in the last 72 hours. PT/INR: No results for input(s): PROTIME, INR in the last 72 hours. APTT:No results for input(s):  APTT in the last 72 hours. CARDIAC ENZYMES:No results for input(s): CKTOTAL, CKMB, CKMBINDEX, TROPONINI in the last 72 hours. FASTING LIPID PANEL:  Lab Results   Component Value Date    HDL 62 10/07/2020    TRIG 124 08/05/2016     LIVER PROFILE:  Recent Labs     10/18/20  0049 10/19/20  0543   AST 55* 38   ALT 52* 44*   LABALBU 4.4 4.1         IMPRESSION:    Patient Active Problem List   Diagnosis    Atrial fibrillation/flutter ([address]    [facility] fibrillation with RVR ([facility])    Atrial fibrillation ([facility])       RECOMMENDATIONS:  1. Paroxysmal atrial fibrillation  2. Atrial flutter  Patient has been having recurrent episodes of atrial fibrillation and flutter which has failed medical therapy. Recently discharged but came back to the hospital.  I will recommend electrophysiology study with radiofrequency catheter ablation. The procedure was discussed in detail including venous access mapping and ablation. He understood and wished to proceed all his questions were answered to satisfaction. Discussed with patient and nursing.     Ricky Cisse MD  [place] [facility] cardiology

## 2020-10-20 NOTE — ED PROVIDER NOTES
[facility] ED  EMERGENCY DEPARTMENT ENCOUNTER      Pt Name: Samantha Wood  MRN: [id number]  [place] 1950  Date of evaluation: 10/19/20  CHIEF COMPLAINT       Chief Complaint   Patient presents with    Palpitations     HISTORY OF PRESENT ILLNESS   Presents to the emergency room via squad with heart palpitations. He has a history of A. fib, in fact, he was discharged from his hospital this afternoon after being treated for A. fib. He states that he was feeling fine at discharge but within the past 2 hours again began having palpitations. He checked his pulse and saw it ranging between 50 to 80 and as high as the 130's. He states that he saw his cardiologist prior to being discharged today and the plan was for probable ablation at some point. He denies any chest pain with these palpitations. No dizziness, diaphoresis, cough, body aches or cold symptoms. The history is provided by the patient. REVIEW OF SYSTEMS     Review of Systems   Constitutional: Negative for activity change and fever. HENT: Negative for congestion and sinus pressure. Respiratory: Negative for cough and shortness of breath. Cardiovascular: Positive for palpitations. Negative for chest pain and leg swelling. Gastrointestinal: Negative for nausea and vomiting. Musculoskeletal: Negative for arthralgias and myalgias. Skin: Negative for color change and wound. Neurological: Negative for dizziness and headaches. Psychiatric/Behavioral: Negative for confusion and decreased concentration.      PASTMEDICAL HISTORY     Past Medical History:   Diagnosis Date    Arthritis     Atrial fibrillation (HCC)     GERD (gastroesophageal reflux disease)     acid reflex    Hyperlipidemia      SURGICAL HISTORY       Past Surgical History:   Procedure Laterality Date    APPENDECTOMY      HERNIA REPAIR      NASAL SEPTUM SURGERY      NASAL SEPTUM SURGERY      when 23 yrs old    TONSILLECTOMY       CURRENT MEDICATIONS Previous Medications    ALPRAZOLAM (XANAX) 0.25 MG TABLET    Take 0.25 mg by mouth 2 times daily as needed for Sleep or Anxiety. ASPIRIN 81 MG TABLET    Take 81 mg by mouth daily     ATORVASTATIN (LIPITOR) 20 MG TABLET    Take 20 mg by mouth daily     CALCIUM-MAGNESIUM-VITAMIN D ER (CALCIUM 1200+D3) 600- MG-MG-UNIT TB24    Take 1 tablet by mouth daily    DOCUSATE SODIUM (COLACE, DULCOLAX) 100 MG CAPS    Take 100 mg by mouth daily as needed for Constipation    FLUTICASONE (FLONASE) 50 MCG/ACT NASAL SPRAY    1 spray by Each Nostril route daily as needed for Allergies    MELATONIN 10 MG TABS    Take 10 mg by mouth nightly    METOPROLOL SUCCINATE (TOPROL XL) 25 MG EXTENDED RELEASE TABLET    Take 25 mg by mouth daily    MULTIPLE VITAMINS-MINERALS (THERAPEUTIC MULTIVITAMIN-MINERALS) TABLET    Take 1 tablet by mouth daily    OMEPRAZOLE 20 MG EC TABLET    Take 20 mg by mouth daily     TURMERIC (QC TUMERIC COMPLEX) 500 MG CAPS    Take 1 tablet by mouth daily     ALLERGIES     is allergic to seasonal.  FAMILY HISTORY     He indicated that his mother is alive. He indicated that his father is . He indicated that his sister is alive. SOCIAL HISTORY       Social History     Tobacco Use    Smoking status: Passive Smoke Exposure - Never Smoker    Smokeless tobacco: Never Used   Substance Use Topics    Alcohol use: Yes     Alcohol/week: 30.0 standard drinks     Types: 30 Cans of beer per week     Comment: 30 beers per week, occ scotch or tequila    Drug use: No     PHYSICAL EXAM     INITIAL VITALS: /83   Pulse 105   Temp 97.8 °F (36.6 °C) (Oral)   Resp 16   Ht 5' 11\" (1.803 m)   Wt 208 lb (94.3 kg)   SpO2 94%   BMI 29.01 kg/m²    Physical Exam  Constitutional:       Appearance: Normal appearance. HENT:      Right Ear: External ear normal.      Left Ear: External ear normal.   Eyes:      General:         Right eye: No discharge. Left eye: No discharge.       Conjunctiva/sclera: Conjunctivae normal.   Cardiovascular:      Rate and Rhythm: Normal rate and regular rhythm. Pulmonary:      Effort: Pulmonary effort is normal.      Breath sounds: Normal breath sounds. Abdominal:      General: Bowel sounds are normal.      Palpations: Abdomen is soft. Tenderness: There is no abdominal tenderness. Skin:     General: Skin is warm and dry. Neurological:      General: No focal deficit present. Mental Status: He is alert and oriented to person, place, and time. Psychiatric:         Mood and Affect: Mood normal.         Thought Content: Thought content normal.         DIAGNOSTIC RESULTS     RADIOLOGY:All plain film, CT, MRI, and formal ultrasound images (except ED bedside ultrasound) are read by the radiologist, see reports below, unless otherwise noted in MDM or here. Interpretation per the Radiologist below, if available at the time of this note:    XR CHEST PORTABLE   Final Result   Unremarkable single portable AP upright view of the chest.             LABS:  Labs Reviewed   CBC WITH AUTO DIFFERENTIAL - Abnormal; Notable for the following components:       Result Value    MCH 34.0 (*)     All other components within normal limits   BASIC METABOLIC PANEL   TROP/MYOGLOBIN   TROP/MYOGLOBIN       All other labs were within normal range or not returned as of this dictation. EMERGENCY DEPARTMENT COURSE and DIFFERENTIAL DIAGNOSIS/MDM:   Vitals:    Vitals:    10/19/20 2237 10/19/20 2238 10/19/20 2253 10/19/20 2307   BP: (!) 148/112 (!) 148/112 (!) 120/100 128/83   Pulse: 134  136 105   Resp: 16  20 16   Temp:  97.8 °F (36.6 °C)     TempSrc:  Oral     SpO2: 99%  95% 94%   Weight:       Height:           Medical Decision Makin-year-old male presents with palpitations. EKG confirms A. fib in the 130s. Blood work and chest x-ray are unremarkable. He received Cardizem bolus and is currently on a drip. He will be admitted for further evaluation and treatment. CONSULTS:  IP CONSULT TO INTERNAL MEDICINE  IP CONSULT TO CARDIOLOGY    PROCEDURES:  None    The patient was given the following meds in the ED:  Orders Placed This Encounter   Medications    dilTIAZem 125 mg in dextrose 5 % 125 mL infusion    dilTIAZem injection 15 mg       FINAL IMPRESSION      1. Atrial fibrillation, unspecified type [facility])          DISPOSITION/PLAN   DISPOSITION Decision To Admit 10/19/2020 11:22:41 PM      PATIENT REFERRED TO:   No follow-up provider specified.     DISCHARGE MEDICATIONS:     New Prescriptions    No medications on file       CRITICAL CARE TIME       Please note that portions of this note were completed with a voice recognition program.      Senia MINOR [id number], APRN - CNP  10/19/20 3632

## 2020-10-21 LAB
ABSOLUTE EOS #: 0.16 K/UL (ref 0–0.44)
ABSOLUTE IMMATURE GRANULOCYTE: 0.02 K/UL (ref 0–0.3)
ABSOLUTE LYMPH #: 2.23 K/UL (ref 1.1–3.7)
ABSOLUTE MONO #: 0.52 K/UL (ref 0.1–1.2)
ANION GAP SERPL CALCULATED.3IONS-SCNC: 6 MMOL/L (ref 9–17)
BASOPHILS # BLD: 1 % (ref 0–2)
BASOPHILS ABSOLUTE: 0.03 K/UL (ref 0–0.2)
BUN BLDV-MCNC: 17 MG/DL (ref 8–23)
BUN/CREAT BLD: 17 (ref 9–20)
CALCIUM SERPL-MCNC: 9.8 MG/DL (ref 8.6–10.4)
CHLORIDE BLD-SCNC: 104 MMOL/L (ref 98–107)
CO2: 30 MMOL/L (ref 20–31)
CREAT SERPL-MCNC: 0.99 MG/DL (ref 0.7–1.2)
DIFFERENTIAL TYPE: NORMAL
EOSINOPHILS RELATIVE PERCENT: 3 % (ref 1–4)
GFR AFRICAN AMERICAN: >60 ML/MIN
GFR NON-AFRICAN AMERICAN: >60 ML/MIN
GFR SERPL CREATININE-BSD FRML MDRD: ABNORMAL ML/MIN/{1.73_M2}
GFR SERPL CREATININE-BSD FRML MDRD: ABNORMAL ML/MIN/{1.73_M2}
GLUCOSE BLD-MCNC: 118 MG/DL (ref 70–99)
HCT VFR BLD CALC: 44.7 % (ref 40.7–50.3)
HEMOGLOBIN: 14.6 G/DL (ref 13–17)
IMMATURE GRANULOCYTES: 0 %
INR BLD: 1.1
LYMPHOCYTES # BLD: 38 % (ref 24–43)
MCH RBC QN AUTO: 33.3 PG (ref 25.2–33.5)
MCHC RBC AUTO-ENTMCNC: 32.7 G/DL (ref 28.4–34.8)
MCV RBC AUTO: 102.1 FL (ref 82.6–102.9)
MONOCYTES # BLD: 9 % (ref 3–12)
NRBC AUTOMATED: 0 PER 100 WBC
PDW BLD-RTO: 11.9 % (ref 11.8–14.4)
PLATELET # BLD: 169 K/UL (ref 138–453)
PLATELET ESTIMATE: NORMAL
PMV BLD AUTO: 10.8 FL (ref 8.1–13.5)
POTASSIUM SERPL-SCNC: 5 MMOL/L (ref 3.7–5.3)
PROTHROMBIN TIME: 13.6 SEC (ref 11.5–14.2)
RBC # BLD: 4.38 M/UL (ref 4.21–5.77)
RBC # BLD: NORMAL 10*6/UL
SARS-COV-2, RAPID: NOT DETECTED
SARS-COV-2: NORMAL
SARS-COV-2: NORMAL
SEG NEUTROPHILS: 49 % (ref 36–65)
SEGMENTED NEUTROPHILS ABSOLUTE COUNT: 2.92 K/UL (ref 1.5–8.1)
SODIUM BLD-SCNC: 140 MMOL/L (ref 135–144)
SOURCE: NORMAL
WBC # BLD: 5.9 K/UL (ref 3.5–11.3)
WBC # BLD: NORMAL 10*3/UL

## 2020-10-21 PROCEDURE — U0002 COVID-19 LAB TEST NON-CDC: HCPCS

## 2020-10-21 PROCEDURE — 80048 BASIC METABOLIC PNL TOTAL CA: CPT

## 2020-10-21 PROCEDURE — 36415 COLL VENOUS BLD VENIPUNCTURE: CPT

## 2020-10-21 PROCEDURE — 6360000002 HC RX W HCPCS: Performed by: INTERNAL MEDICINE

## 2020-10-21 PROCEDURE — 6370000000 HC RX 637 (ALT 250 FOR IP): Performed by: FAMILY MEDICINE

## 2020-10-21 PROCEDURE — 85610 PROTHROMBIN TIME: CPT

## 2020-10-21 PROCEDURE — 2580000003 HC RX 258: Performed by: FAMILY MEDICINE

## 2020-10-21 PROCEDURE — 85025 COMPLETE CBC W/AUTO DIFF WBC: CPT

## 2020-10-21 PROCEDURE — 2060000000 HC ICU INTERMEDIATE R&B

## 2020-10-21 RX ADMIN — ASPIRIN 81 MG: 81 TABLET, COATED ORAL at 08:57

## 2020-10-21 RX ADMIN — ATORVASTATIN CALCIUM 20 MG: 20 TABLET, FILM COATED ORAL at 21:30

## 2020-10-21 RX ADMIN — FAMOTIDINE 20 MG: 20 TABLET ORAL at 08:57

## 2020-10-21 RX ADMIN — FAMOTIDINE 20 MG: 20 TABLET ORAL at 21:30

## 2020-10-21 RX ADMIN — SODIUM CHLORIDE, PRESERVATIVE FREE 10 ML: 5 INJECTION INTRAVENOUS at 21:30

## 2020-10-21 RX ADMIN — ALPRAZOLAM 0.25 MG: 0.25 TABLET ORAL at 23:49

## 2020-10-21 RX ADMIN — ACETAMINOPHEN 650 MG: 325 TABLET ORAL at 12:02

## 2020-10-21 RX ADMIN — MELATONIN TAB 3 MG 9 MG: 3 TAB at 21:30

## 2020-10-21 RX ADMIN — SODIUM CHLORIDE, PRESERVATIVE FREE 10 ML: 5 INJECTION INTRAVENOUS at 08:58

## 2020-10-21 RX ADMIN — ENOXAPARIN SODIUM 40 MG: 40 INJECTION SUBCUTANEOUS at 08:58

## 2020-10-21 ASSESSMENT — PAIN DESCRIPTION - DESCRIPTORS: DESCRIPTORS: HEADACHE

## 2020-10-21 ASSESSMENT — PAIN SCALES - GENERAL
PAINLEVEL_OUTOF10: 0
PAINLEVEL_OUTOF10: 2
PAINLEVEL_OUTOF10: 0

## 2020-10-21 ASSESSMENT — PAIN DESCRIPTION - ONSET: ONSET: ON-GOING

## 2020-10-21 ASSESSMENT — PAIN DESCRIPTION - PAIN TYPE: TYPE: ACUTE PAIN

## 2020-10-21 ASSESSMENT — PAIN DESCRIPTION - LOCATION: LOCATION: HEAD

## 2020-10-21 ASSESSMENT — PAIN - FUNCTIONAL ASSESSMENT: PAIN_FUNCTIONAL_ASSESSMENT: ACTIVITIES ARE NOT PREVENTED

## 2020-10-21 ASSESSMENT — PAIN DESCRIPTION - FREQUENCY: FREQUENCY: CONTINUOUS

## 2020-10-21 NOTE — CARE COORDINATION
Case Management Initial Discharge Plan  Alexi Barreto,         Readmission Risk              Risk of Unplanned Readmission:        13             Met with:patient to discuss discharge plans. Information verified: address, contacts, phone number, , insurance Yes  PCP: Hakan Maya MD  Date of last visit: 10/14    Insurance Provider: Krissy Perez and [facility]     Discharge Planning     Living Arrangements:  Spouse/Significant Other   Support Systems:  Spouse/Significant Other     Home has 1 stories, 3 stairs to climb to get into front door    Location of bedroom/bathroom in home  - main floor      Patient able to perform ADL's:Independent     Current Services (outpatient & in home) none  DME equipment: cane, crutches and shower seat   DME provider: N/A     [facility]: [facility]     Potential Assistance Needed:  N/A     Patient agreeable to home care: No  [place] of choice provided:  n/a     Prior SNF/Rehab Placement and Facility: No  Agreeable to SNF/Rehab: No  [place] of choice provided: n/a   Evaluation: n/a     Expected Discharge date:  10/23/20  Patient expects to be discharged to:  home  Follow Up Appointment: Best Day/ Time:       Transportation provider: wife  Transportation arrangements needed for discharge: No           Discharge Plan:   Patient was admitted 10/18-10/19 and was only home a few hours before had rapid heart beat. .     Patient lives with wife, independent and drives. Has history of afib and takes baby ASA daily he is not on any anticoagulation at home. Plan is for ablation tomorrow and discharge home on Friday. .    Will continue to follow for any needs. He is not on any anticoagulation at this time. Follow up post ablation.      Electronically signed by Jose David Shaffer RN on 10/21/20 at 9:50 AM EDT

## 2020-10-21 NOTE — H&P
History & Physical  [facility].,    Adult Hospitalist      Name: Justine Marie  MRN: [id number]     Acct: [de-identified]  Room: 2036/2036-01    Admit Date: 10/19/2020 10:31 PM  PCP: Darius Wyatt MD    Primary Problem  Active Problems:    Atrial fibrillation [facility])  Resolved Problems:    * No resolved hospital problems. *        Assesment:     · Atrial fibrillation controlled medically response  · History of ventricular tachycardia  · Alcohol abuse  · Abnormal LFTs secondary to above        Plan:     · Admit to CVU  · Augmentin oxygen saturation greater than 50%  · Serial troponin  · IV Lopressor as needed  · Cardiology consult, plan for ablation  · Increase metoprolol  · Monitor LFTs  · DVT and GI prophylaxis. Chief Complaint:     Chief Complaint   Patient presents with    Palpitations         History of Present Illness:      Justine Marie is a 78 y.o.  male who presents with Palpitations    22-year-old gentleman with past medical history of atrial fibrillation discharged on 10/19/2020 after being treated for atrial fibrillation presented to ER after 3 hours of his discharge. Patient stated that his heart rate was racing and he was complaining of palpitation. He checked his pulse and he noticed that his heart rate was variable from 50-80-1 30s. He stated that he discussed with his cardiologist for possible ablation. So he came back. In the hospital patient has frequent PVCs. Patient denies any chest pain, cough, cold, changes in urination, bowel habit or rash. Return to the cardiology consult for further management  I have personally reviewed the past medical history, past surgical history, medications, social history, and family history, and summarized in the note. Review of Systems:     All 10 point system is reviewed and negative otherwise mentioned in HPI.       Past Medical History:     Past Medical History:   Diagnosis Date    Arthritis     Atrial fibrillation (HCC)     GERD (gastroesophageal reflux disease)     acid reflex    Hyperlipidemia         Past Surgical History:     Past Surgical History:   Procedure Laterality Date    APPENDECTOMY      HERNIA REPAIR      NASAL SEPTUM SURGERY      NASAL SEPTUM SURGERY      when 23 yrs old    TONSILLECTOMY          Medications Prior to Admission:       Prior to Admission medications    Medication Sig Start Date End Date Taking? Authorizing Provider   fluticasone (FLONASE) 50 MCG/ACT nasal spray 1 spray by Each Nostril route daily as needed for Allergies   Yes Historical Provider, MD   ALPRAZolam (XANAX) 0.25 MG tablet Take 0.25 mg by mouth 2 times daily as needed for Sleep or Anxiety. Yes Historical Provider, MD   Turmeric (QC TUMERIC COMPLEX) 500 MG CAPS Take 1 tablet by mouth daily   Yes Historical Provider, MD   Multiple Vitamins-Minerals (THERAPEUTIC MULTIVITAMIN-MINERALS) tablet Take 1 tablet by mouth daily   Yes Historical Provider, MD   Melatonin 10 MG TABS Take 10 mg by mouth nightly   Yes Historical Provider, MD   Calcium-Magnesium-Vitamin D ER (CALCIUM 1200+D3) 600- MG-MG-UNIT TB24 Take 1 tablet by mouth daily   Yes Historical Provider, MD   omeprazole 20 MG EC tablet Take 20 mg by mouth daily    Yes Historical Provider, MD   metoprolol succinate (TOPROL XL) 25 MG extended release tablet Take 25 mg by mouth daily    Historical Provider, MD   atorvastatin (LIPITOR) 20 MG tablet Take 20 mg by mouth daily  10/26/17   Historical Provider, MD   aspirin 81 MG tablet Take 81 mg by mouth daily     Historical Provider, MD        Allergies:       Seasonal    Social History:     Tobacco:    reports that he is a non-smoker but has been exposed to tobacco smoke. He has never used smokeless tobacco.  Alcohol:      reports current alcohol use of about 30.0 standard drinks of alcohol per week. Drug Use:  reports no history of drug use.     Family History:     Family History   Problem Relation Age of Onset    Coronary Art Dis Mother

## 2020-10-21 NOTE — FLOWSHEET NOTE
Dr. Moran arrived to the patient's bedside for evaluation and was updated on the patient's current status via RN. Patient currently sinus shan with pvc's and denies chest pain and palpitations. RN informed Dr. Moran of HR in 20's last night (telemetry in chart). RN informed Dr. Moran of the current lovenox dose, as well as the patient not being on a blood thinner. Per Dr. Moran, patient to begin on a blood thinner post ablation and to remain on current dose of lovenox at this time. No need to hold lovenox for procedure tomorrow. New orders received to discontinue metoprolol and for patient to be NPO starting tonight at midnight. Plan for patient to undergo ablation tomorrow. Will continue to monitor closely.

## 2020-10-21 NOTE — PROGRESS NOTES
reviewed the past medical history, past surgical history, medications, social history, and family history, and summarized in the note. Review of Systems:     All 10 point system is reviewed and negative otherwise mentioned in HPI. Past Medical History:     Past Medical History:   Diagnosis Date    Arthritis     Atrial fibrillation (HCC)     GERD (gastroesophageal reflux disease)     acid reflex    Hyperlipidemia         Past Surgical History:     Past Surgical History:   Procedure Laterality Date    APPENDECTOMY      HERNIA REPAIR      NASAL SEPTUM SURGERY      NASAL SEPTUM SURGERY      when 23 yrs old    TONSILLECTOMY          Medications Prior to Admission:       Prior to Admission medications    Medication Sig Start Date End Date Taking? Authorizing Provider   fluticasone (FLONASE) 50 MCG/ACT nasal spray 1 spray by Each Nostril route daily as needed for Allergies   Yes Historical Provider, MD   ALPRAZolam (XANAX) 0.25 MG tablet Take 0.25 mg by mouth 2 times daily as needed for Sleep or Anxiety.     Yes Historical Provider, MD   Turmeric (QC TUMERIC COMPLEX) 500 MG CAPS Take 1 tablet by mouth daily   Yes Historical Provider, MD   Multiple Vitamins-Minerals (THERAPEUTIC MULTIVITAMIN-MINERALS) tablet Take 1 tablet by mouth daily   Yes Historical Provider, MD   Melatonin 10 MG TABS Take 10 mg by mouth nightly   Yes Historical Provider, MD   Calcium-Magnesium-Vitamin D ER (CALCIUM 1200+D3) 600- MG-MG-UNIT TB24 Take 1 tablet by mouth daily   Yes Historical Provider, MD   omeprazole 20 MG EC tablet Take 20 mg by mouth daily    Yes Historical Provider, MD   metoprolol succinate (TOPROL XL) 25 MG extended release tablet Take 25 mg by mouth daily    Historical Provider, MD   atorvastatin (LIPITOR) 20 MG tablet Take 20 mg by mouth daily  10/26/17   Historical Provider, MD   aspirin 81 MG tablet Take 81 mg by mouth daily     Historical Provider, MD        Allergies:       Seasonal    Social History: Tobacco:    reports that he is a non-smoker but has been exposed to tobacco smoke. He has never used smokeless tobacco.  Alcohol:      reports current alcohol use of about 30.0 standard drinks of alcohol per week. Drug Use:  reports no history of drug use.     Family History:     Family History   Problem Relation Age of Onset    Coronary Art Dis Mother     Coronary Art Dis Father     Heart Attack Father     Diabetes Sister     Cancer Sister          Physical Exam:     Vitals:  BP (!) 142/74   Pulse 57   Temp 97.2 °F (36.2 °C) (Temporal)   Resp 18   Ht 5' 11\" (1.803 m)   Wt 206 lb (93.4 kg)   SpO2 100%   BMI 28.73 kg/m²   Temp (24hrs), Av.1 °F (36.2 °C), Min:96.8 °F (36 °C), Max:97.4 °F (36.3 °C)          General appearance - alert, well appearing, and in no acute distress  Mental status - oriented to person, place, and time with normal affect  Head - normocephalic and atraumatic  Eyes - pupils equal and reactive, extraocular eye movements intact, conjunctiva clear  Ears - hearing appears to be intact  Nose - no drainage noted  Mouth - mucous membranes moist  Neck - supple, no carotid bruits, thyroid not palpable  Chest - clear to auscultation, normal effort  Heart - normal rate, regular rhythm, no murmur  Abdomen - soft, nontender, nondistended, bowel sounds present all four quadrants, no masses, hepatomegaly or splenomegaly  Neurological - normal speech, no focal findings or movement disorder noted, cranial nerves II through XII grossly intact  Extremities - peripheral pulses palpable, no pedal edema or calf pain with palpation  Skin - no gross lesions, rashes, or induration noted        Data:     Labs:    Hematology:  Recent Labs     10/19/20  0543 10/19/20  2240 10/21/20  0444   WBC 6.5 7.2 5.9   RBC 4.27 4.67 4.38   HGB 14.5 15.9 14.6   HCT 43.4 46.9 44.7   .6 100.4 102.1   MCH 34.0* 34.0* 33.3   MCHC 33.4 33.9 32.7   RDW 12.3 12.1 11.9    146 169   MPV 11.7 12.2 10.8   INR  -- --  1.1     Chemistry:  Recent Labs     10/19/20  0543 10/19/20  2240 10/20/20  0102 10/21/20  0444    139  --  140   K 4.6 4.0  --  5.0    103  --  104   CO2 28 23  --  30   GLUCOSE 125* 99  --  118*   BUN 14 16  --  17   CREATININE 0.98 0.93  --  0.99   ANIONGAP 7* 13  --  6*   LABGLOM >60 >60  --  >60   GFRAA >60 >60  --  >60   CALCIUM 9.5 10.2  --  9.8   TROPHS  --  13 14  --    MYOGLOBIN  --  46 41  --      Recent Labs     10/19/20  0543   PROT 6.6   LABALBU 4.1   TSH 3.33   AST 38   ALT 44*   ALKPHOS 45   BILITOT 1.28*       Lab Results   Component Value Date    INR 1.1 10/21/2020    INR 1.0 01/29/2020    PROTIME 13.6 10/21/2020    PROTIME 10.7 01/29/2020       No results found for: SPECIAL  No results found for: CULTURE    No results found for: POCPH, PHART, PH, POCPCO2, VBU1MGC, PCO2, POCPO2, PO2ART, PO2, POCHCO3, ANM5YJA, HCO3, NBEA, PBEA, BEART, BE, THGBART, THB, YMO2KUG, LKIV7BWQ, N2EUMXXS, O2SAT, FIO2    Radiology:    Xr Chest Portable    Result Date: 10/19/2020  Unremarkable single portable AP upright view of the chest.     Xr Chest Portable    Result Date: 10/18/2020  Borderline cardiomegaly, without acute cardiopulmonary disease         All radiological studies reviewed                Code Status:  Full Code    Electronically signed by Susie Sharp MD on 10/21/2020 at 5:38 PM     Copy sent to Dr. Edu Rice MD    This note was created with the assistance of a speech-recognition program.  Although the intention is to generate a document that actually reflects the content of the visit, no guarantees can be provided that every mistake has been identified and corrected by editing. Note was updated later by me after  physical examination and  completion of the assessment.

## 2020-10-21 NOTE — FLOWSHEET NOTE
Patient dressed, standing in room; states he will have a cardiac ablation tomorrow; engages in friendly conversation; expresses no spiritual/emotional needs at this time; appears to be coping normally. Writer provides listening presence and supportive conversation. Patient expresses gratitude for visit. Spiritual Care will follow as needed. 10/21/20 1130   Encounter Summary   Services provided to: Patient   Referral/Consult From: Luis   Continue Visiting   (10/21/20)   Complexity of Encounter Low   Length of Encounter 15 minutes   Spiritual Assessment Completed Yes   Routine   Type Follow up   Assessment Calm; Approachable;Coping   Intervention Active listening;Explored feelings, thoughts, concerns;Explored coping resources   Outcome Expressed gratitude;Engaged in conversation;Expressed feelings/needs/concerns

## 2020-10-21 NOTE — PROGRESS NOTES
[facility].,   Section of Cardiology  Progress Note      Date:  10/21/2020  Patient: Jovan Setting  Admission:  10/19/2020 10:31 PM  Admit DX: Atrial fibrillation ([address] [I48.91]  Age:  78 y.o., 1950                           LOS: 2 days     Reason for evaluation:   atrial fibrillation  Atrial flutter. SUBJECTIVE:     The patient was seen and examined. Notes and labs reviewed. There were complications over night. He was bradycardic with a heart rate of 20. Patient's cardiac review of systems: negative. The patient is generally feeling unchanged. OBJECTIVE:    /75   Pulse 52   Temp 96.9 °F (36.1 °C) (Temporal)   Resp 18   Ht 5' 11\" (1.803 m)   Wt 206 lb (93.4 kg)   SpO2 100%   BMI 28.73 kg/m²     Intake/Output Summary (Last 24 hours) at 10/21/2020 1031  Last data filed at 10/21/2020 0932  Gross per 24 hour   Intake 640 ml   Output --   Net 640 ml       EXAM:   CONSTITUTIONAL:  awake, alert, cooperative, no apparent distress, and appears stated age. HEENT: Normal jugular venous pulsations, no carotid bruits. Head is atraumatic, normocephalic. Eyes and oral mucosa are normal.  LUNGS: Good respiratory effort. No for increased work of breathing. On auscultation: clear to auscultation bilaterally  CARDIOVASCULAR:  Normal apical impulse, regular rate and rhythm, normal S1 and S2, no S3 or S4, and no murmur or rub noted. ABDOMEN: Soft, nontender, nondistended. Bowel sounds present. No masses or tenderness. SKIN: Warm and dry. EXTREMITIES: No lower extremity edema. Motor movement grossly intact. No cyanosis or clubbing.     Current Inpatient Medications:   aspirin  81 mg Oral Daily    atorvastatin  20 mg Oral Nightly    melatonin  9 mg Oral Nightly    sodium chloride flush  10 mL Intravenous 2 times per day    famotidine  20 mg Oral BID    metoprolol tartrate  25 mg Oral BID    enoxaparin  40 mg Subcutaneous Daily       IV Infusions (if any):   dilTIAZem (CARDIZEM) 125 mg in dextrose 5% 125 mL infusion Stopped (10/20/20 0424)       Diagnostics:   Telemetry: Sinus    Labs:   CBC:   Recent Labs     10/19/20  2240 10/21/20  0444   WBC 7.2 5.9   HGB 15.9 14.6   HCT 46.9 44.7    169     BMP:   Recent Labs     10/19/20  2240 10/21/20  0444    140   K 4.0 5.0   CO2 23 30   BUN 16 17   CREATININE 0.93 0.99   LABGLOM >60 >60   GLUCOSE 99 118*     BNP: No results for input(s): BNP in the last 72 hours. PT/INR:   Recent Labs     10/21/20  0444   PROTIME 13.6   INR 1.1     APTT:No results for input(s): APTT in the last 72 hours. CARDIAC ENZYMES:No results for input(s): CKTOTAL, CKMB, CKMBINDEX, TROPONINI in the last 72 hours. FASTING LIPID PANEL:  Lab Results   Component Value Date    HDL 62 10/07/2020    TRIG 124 08/05/2016     LIVER PROFILE:  Recent Labs     10/19/20  0543   AST 38   ALT 44*   LABALBU 4.1       ASSESSMENT:    Patient Active Problem List   Diagnosis    [facility]utter [address]    Atrial fibrillation with RVR (HCC)    [facility]       PLAN:    1. Patient has paroxysmal atrial fibrillation with recurrent admissions. He will have atrial fibrillation and flutter ablation done. The procedure was discussed in detail including venous access,mapping and ablation. The potential complications were discussed. Patient understood and wished to proceed. All his questions were answered to his satisfaction. Please see orders. Discussed with patient and nursing.     Rose Higgins MD

## 2020-10-22 ENCOUNTER — ANESTHESIA (OUTPATIENT)
Dept: CARDIAC CATH/INVASIVE PROCEDURES | Age: 70
DRG: 274 | End: 2020-10-22
Payer: MEDICARE

## 2020-10-22 ENCOUNTER — ANESTHESIA EVENT (OUTPATIENT)
Dept: CARDIAC CATH/INVASIVE PROCEDURES | Age: 70
DRG: 274 | End: 2020-10-22
Payer: MEDICARE

## 2020-10-22 VITALS — OXYGEN SATURATION: 92 % | SYSTOLIC BLOOD PRESSURE: 151 MMHG | TEMPERATURE: 97 F | DIASTOLIC BLOOD PRESSURE: 93 MMHG

## 2020-10-22 LAB
ABSOLUTE EOS #: 0.13 K/UL (ref 0–0.44)
ABSOLUTE IMMATURE GRANULOCYTE: 0.02 K/UL (ref 0–0.3)
ABSOLUTE LYMPH #: 2.42 K/UL (ref 1.1–3.7)
ABSOLUTE MONO #: 0.65 K/UL (ref 0.1–1.2)
ANION GAP SERPL CALCULATED.3IONS-SCNC: 6 MMOL/L (ref 9–17)
BASOPHILS # BLD: 1 % (ref 0–2)
BASOPHILS ABSOLUTE: 0.03 K/UL (ref 0–0.2)
BUN BLDV-MCNC: 15 MG/DL (ref 8–23)
BUN/CREAT BLD: 16 (ref 9–20)
CALCIUM SERPL-MCNC: 9.8 MG/DL (ref 8.6–10.4)
CHLORIDE BLD-SCNC: 106 MMOL/L (ref 98–107)
CO2: 28 MMOL/L (ref 20–31)
CREAT SERPL-MCNC: 0.96 MG/DL (ref 0.7–1.2)
DIFFERENTIAL TYPE: NORMAL
EOSINOPHILS RELATIVE PERCENT: 2 % (ref 1–4)
GFR AFRICAN AMERICAN: >60 ML/MIN
GFR NON-AFRICAN AMERICAN: >60 ML/MIN
GFR SERPL CREATININE-BSD FRML MDRD: ABNORMAL ML/MIN/{1.73_M2}
GFR SERPL CREATININE-BSD FRML MDRD: ABNORMAL ML/MIN/{1.73_M2}
GLUCOSE BLD-MCNC: 117 MG/DL (ref 70–99)
HCT VFR BLD CALC: 43.8 % (ref 40.7–50.3)
HEMOGLOBIN: 14.3 G/DL (ref 13–17)
IMMATURE GRANULOCYTES: 0 %
LV EF: 50 %
LVEF MODALITY: NORMAL
LYMPHOCYTES # BLD: 39 % (ref 24–43)
MCH RBC QN AUTO: 33.2 PG (ref 25.2–33.5)
MCHC RBC AUTO-ENTMCNC: 32.6 G/DL (ref 28.4–34.8)
MCV RBC AUTO: 101.6 FL (ref 82.6–102.9)
MONOCYTES # BLD: 11 % (ref 3–12)
NRBC AUTOMATED: 0 PER 100 WBC
PDW BLD-RTO: 11.9 % (ref 11.8–14.4)
PLATELET # BLD: 172 K/UL (ref 138–453)
PLATELET ESTIMATE: NORMAL
PMV BLD AUTO: 11.7 FL (ref 8.1–13.5)
POTASSIUM SERPL-SCNC: 4.2 MMOL/L (ref 3.7–5.3)
RBC # BLD: 4.31 M/UL (ref 4.21–5.77)
RBC # BLD: NORMAL 10*6/UL
SEG NEUTROPHILS: 48 % (ref 36–65)
SEGMENTED NEUTROPHILS ABSOLUTE COUNT: 2.95 K/UL (ref 1.5–8.1)
SODIUM BLD-SCNC: 140 MMOL/L (ref 135–144)
WBC # BLD: 6.2 K/UL (ref 3.5–11.3)
WBC # BLD: NORMAL 10*3/UL

## 2020-10-22 PROCEDURE — C1732 CATH, EP, DIAG/ABL, 3D/VECT: HCPCS

## 2020-10-22 PROCEDURE — 6360000004 HC RX CONTRAST MEDICATION

## 2020-10-22 PROCEDURE — C1730 CATH, EP, 19 OR FEW ELECT: HCPCS

## 2020-10-22 PROCEDURE — 93613 INTRACARDIAC EPHYS 3D MAPG: CPT | Performed by: INTERNAL MEDICINE

## 2020-10-22 PROCEDURE — C1894 INTRO/SHEATH, NON-LASER: HCPCS

## 2020-10-22 PROCEDURE — 2709999900 HC NON-CHARGEABLE SUPPLY

## 2020-10-22 PROCEDURE — 93662 INTRACARDIAC ECG (ICE): CPT | Performed by: INTERNAL MEDICINE

## 2020-10-22 PROCEDURE — 93005 ELECTROCARDIOGRAM TRACING: CPT | Performed by: INTERNAL MEDICINE

## 2020-10-22 PROCEDURE — 6370000000 HC RX 637 (ALT 250 FOR IP): Performed by: INTERNAL MEDICINE

## 2020-10-22 PROCEDURE — 80048 BASIC METABOLIC PNL TOTAL CA: CPT

## 2020-10-22 PROCEDURE — C1766 INTRO/SHEATH,STRBLE,NON-PEEL: HCPCS

## 2020-10-22 PROCEDURE — 2060000000 HC ICU INTERMEDIATE R&B

## 2020-10-22 PROCEDURE — 93656 COMPRE EP EVAL ABLTJ ATR FIB: CPT | Performed by: INTERNAL MEDICINE

## 2020-10-22 PROCEDURE — 6360000002 HC RX W HCPCS: Performed by: SPECIALIST

## 2020-10-22 PROCEDURE — C1733 CATH, EP, OTHR THAN COOL-TIP: HCPCS

## 2020-10-22 PROCEDURE — 6360000002 HC RX W HCPCS

## 2020-10-22 PROCEDURE — 2580000003 HC RX 258: Performed by: SPECIALIST

## 2020-10-22 PROCEDURE — 2500000003 HC RX 250 WO HCPCS: Performed by: SPECIALIST

## 2020-10-22 PROCEDURE — 36415 COLL VENOUS BLD VENIPUNCTURE: CPT

## 2020-10-22 PROCEDURE — 2580000003 HC RX 258: Performed by: FAMILY MEDICINE

## 2020-10-22 PROCEDURE — 93655 ICAR CATH ABLTJ DSCRT ARRHYT: CPT | Performed by: INTERNAL MEDICINE

## 2020-10-22 PROCEDURE — 2580000003 HC RX 258: Performed by: INTERNAL MEDICINE

## 2020-10-22 PROCEDURE — C1725 CATH, TRANSLUMIN NON-LASER: HCPCS

## 2020-10-22 PROCEDURE — 93657 TX L/R ATRIAL FIB ADDL: CPT | Performed by: INTERNAL MEDICINE

## 2020-10-22 PROCEDURE — 6370000000 HC RX 637 (ALT 250 FOR IP): Performed by: FAMILY MEDICINE

## 2020-10-22 PROCEDURE — 85025 COMPLETE CBC W/AUTO DIFF WBC: CPT

## 2020-10-22 PROCEDURE — C1769 GUIDE WIRE: HCPCS

## 2020-10-22 PROCEDURE — 2720000010 HC SURG SUPPLY STERILE

## 2020-10-22 PROCEDURE — 02583ZZ DESTRUCTION OF CONDUCTION MECHANISM, PERCUTANEOUS APPROACH: ICD-10-PCS | Performed by: INTERNAL MEDICINE

## 2020-10-22 PROCEDURE — 93312 ECHO TRANSESOPHAGEAL: CPT

## 2020-10-22 RX ORDER — SUCCINYLCHOLINE CHLORIDE 20 MG/ML
INJECTION INTRAMUSCULAR; INTRAVENOUS PRN
Status: DISCONTINUED | OUTPATIENT
Start: 2020-10-22 | End: 2020-10-22 | Stop reason: SDUPTHER

## 2020-10-22 RX ORDER — SODIUM CHLORIDE, SODIUM LACTATE, POTASSIUM CHLORIDE, CALCIUM CHLORIDE 600; 310; 30; 20 MG/100ML; MG/100ML; MG/100ML; MG/100ML
INJECTION, SOLUTION INTRAVENOUS CONTINUOUS PRN
Status: DISCONTINUED | OUTPATIENT
Start: 2020-10-22 | End: 2020-10-22 | Stop reason: SDUPTHER

## 2020-10-22 RX ORDER — HEPARIN SODIUM 10000 [USP'U]/ML
INJECTION, SOLUTION INTRAVENOUS; SUBCUTANEOUS PRN
Status: DISCONTINUED | OUTPATIENT
Start: 2020-10-22 | End: 2020-10-22 | Stop reason: SDUPTHER

## 2020-10-22 RX ORDER — PROPOFOL 10 MG/ML
INJECTION, EMULSION INTRAVENOUS PRN
Status: DISCONTINUED | OUTPATIENT
Start: 2020-10-22 | End: 2020-10-22 | Stop reason: SDUPTHER

## 2020-10-22 RX ORDER — SODIUM CHLORIDE 0.9 % (FLUSH) 0.9 %
10 SYRINGE (ML) INJECTION PRN
Status: DISCONTINUED | OUTPATIENT
Start: 2020-10-22 | End: 2020-10-22 | Stop reason: SDUPTHER

## 2020-10-22 RX ORDER — SODIUM CHLORIDE 0.9 % (FLUSH) 0.9 %
10 SYRINGE (ML) INJECTION EVERY 12 HOURS SCHEDULED
Status: DISCONTINUED | OUTPATIENT
Start: 2020-10-22 | End: 2020-10-22 | Stop reason: SDUPTHER

## 2020-10-22 RX ORDER — PHENYLEPHRINE HCL IN 0.9% NACL 1 MG/10 ML
SYRINGE (ML) INTRAVENOUS PRN
Status: DISCONTINUED | OUTPATIENT
Start: 2020-10-22 | End: 2020-10-22 | Stop reason: SDUPTHER

## 2020-10-22 RX ORDER — DEXAMETHASONE SODIUM PHOSPHATE 10 MG/ML
INJECTION, SOLUTION INTRAMUSCULAR; INTRAVENOUS PRN
Status: DISCONTINUED | OUTPATIENT
Start: 2020-10-22 | End: 2020-10-22 | Stop reason: SDUPTHER

## 2020-10-22 RX ORDER — ACETAMINOPHEN 325 MG/1
650 TABLET ORAL EVERY 4 HOURS PRN
Status: DISCONTINUED | OUTPATIENT
Start: 2020-10-22 | End: 2020-10-23 | Stop reason: HOSPADM

## 2020-10-22 RX ORDER — LIDOCAINE HYDROCHLORIDE 20 MG/ML
INJECTION, SOLUTION INFILTRATION; PERINEURAL PRN
Status: DISCONTINUED | OUTPATIENT
Start: 2020-10-22 | End: 2020-10-22 | Stop reason: SDUPTHER

## 2020-10-22 RX ORDER — FENTANYL CITRATE 50 UG/ML
INJECTION, SOLUTION INTRAMUSCULAR; INTRAVENOUS PRN
Status: DISCONTINUED | OUTPATIENT
Start: 2020-10-22 | End: 2020-10-22 | Stop reason: SDUPTHER

## 2020-10-22 RX ADMIN — ASPIRIN 81 MG: 81 TABLET, COATED ORAL at 08:40

## 2020-10-22 RX ADMIN — Medication 50 MCG: at 13:54

## 2020-10-22 RX ADMIN — Medication 100 MCG: at 15:16

## 2020-10-22 RX ADMIN — SODIUM CHLORIDE, POTASSIUM CHLORIDE, SODIUM LACTATE AND CALCIUM CHLORIDE: 600; 310; 30; 20 INJECTION, SOLUTION INTRAVENOUS at 13:53

## 2020-10-22 RX ADMIN — DEXAMETHASONE SODIUM PHOSPHATE 10 MG: 10 INJECTION INTRAMUSCULAR; INTRAVENOUS at 14:10

## 2020-10-22 RX ADMIN — SODIUM CHLORIDE, PRESERVATIVE FREE 10 ML: 5 INJECTION INTRAVENOUS at 08:44

## 2020-10-22 RX ADMIN — Medication 200 MCG: at 16:12

## 2020-10-22 RX ADMIN — ATORVASTATIN CALCIUM 20 MG: 20 TABLET, FILM COATED ORAL at 22:46

## 2020-10-22 RX ADMIN — Medication 100 MCG: at 14:42

## 2020-10-22 RX ADMIN — FAMOTIDINE 20 MG: 20 TABLET ORAL at 08:40

## 2020-10-22 RX ADMIN — FAMOTIDINE 20 MG: 20 TABLET ORAL at 22:46

## 2020-10-22 RX ADMIN — MELATONIN TAB 3 MG 9 MG: 3 TAB at 22:46

## 2020-10-22 RX ADMIN — PROPOFOL 140 MG: 10 INJECTION, EMULSION INTRAVENOUS at 13:53

## 2020-10-22 RX ADMIN — SODIUM CHLORIDE, PRESERVATIVE FREE 10 ML: 5 INJECTION INTRAVENOUS at 22:47

## 2020-10-22 RX ADMIN — LIDOCAINE HYDROCHLORIDE 100 MG: 20 INJECTION, SOLUTION INFILTRATION; PERINEURAL at 13:53

## 2020-10-22 RX ADMIN — HEPARIN SODIUM 10000 UNITS: 10000 INJECTION, SOLUTION INTRAVENOUS; SUBCUTANEOUS at 14:17

## 2020-10-22 RX ADMIN — SUCCINYLCHOLINE CHLORIDE 100 MG: 20 INJECTION, SOLUTION INTRAMUSCULAR; INTRAVENOUS at 13:54

## 2020-10-22 RX ADMIN — SODIUM CHLORIDE, POTASSIUM CHLORIDE, SODIUM LACTATE AND CALCIUM CHLORIDE: 600; 310; 30; 20 INJECTION, SOLUTION INTRAVENOUS at 13:48

## 2020-10-22 RX ADMIN — PROPOFOL 100 MG: 10 INJECTION, EMULSION INTRAVENOUS at 15:06

## 2020-10-22 RX ADMIN — PROPOFOL 60 MG: 10 INJECTION, EMULSION INTRAVENOUS at 14:00

## 2020-10-22 RX ADMIN — ACETAMINOPHEN 650 MG: 325 TABLET ORAL at 19:56

## 2020-10-22 RX ADMIN — Medication 100 MCG: at 14:11

## 2020-10-22 ASSESSMENT — PAIN SCALES - GENERAL
PAINLEVEL_OUTOF10: 0
PAINLEVEL_OUTOF10: 6
PAINLEVEL_OUTOF10: 0

## 2020-10-22 ASSESSMENT — PULMONARY FUNCTION TESTS
PIF_VALUE: 15
PIF_VALUE: 13
PIF_VALUE: 15
PIF_VALUE: 0
PIF_VALUE: 15
PIF_VALUE: 15
PIF_VALUE: 16
PIF_VALUE: 15
PIF_VALUE: 15
PIF_VALUE: 1
PIF_VALUE: 10
PIF_VALUE: 14
PIF_VALUE: 15
PIF_VALUE: 11
PIF_VALUE: 15
PIF_VALUE: 0
PIF_VALUE: 14
PIF_VALUE: 15
PIF_VALUE: 8
PIF_VALUE: 15
PIF_VALUE: 1
PIF_VALUE: 15
PIF_VALUE: 6
PIF_VALUE: 14
PIF_VALUE: 15
PIF_VALUE: 18
PIF_VALUE: 8
PIF_VALUE: 15
PIF_VALUE: 14
PIF_VALUE: 15
PIF_VALUE: 14
PIF_VALUE: 15
PIF_VALUE: 14
PIF_VALUE: 3
PIF_VALUE: 14
PIF_VALUE: 15
PIF_VALUE: 14
PIF_VALUE: 13
PIF_VALUE: 15
PIF_VALUE: 16
PIF_VALUE: 15
PIF_VALUE: 10
PIF_VALUE: 1
PIF_VALUE: 23
PIF_VALUE: 22
PIF_VALUE: 15
PIF_VALUE: 1
PIF_VALUE: 8
PIF_VALUE: 8
PIF_VALUE: 13
PIF_VALUE: 15
PIF_VALUE: 1
PIF_VALUE: 8
PIF_VALUE: 15
PIF_VALUE: 15
PIF_VALUE: 14
PIF_VALUE: 1
PIF_VALUE: 15
PIF_VALUE: 15
PIF_VALUE: 2
PIF_VALUE: 14
PIF_VALUE: 15
PIF_VALUE: 10
PIF_VALUE: 17
PIF_VALUE: 15
PIF_VALUE: 15
PIF_VALUE: 6
PIF_VALUE: 15
PIF_VALUE: 8
PIF_VALUE: 15
PIF_VALUE: 8
PIF_VALUE: 15
PIF_VALUE: 5
PIF_VALUE: 9
PIF_VALUE: 1
PIF_VALUE: 1
PIF_VALUE: 15
PIF_VALUE: 15
PIF_VALUE: 7
PIF_VALUE: 15
PIF_VALUE: 2
PIF_VALUE: 14
PIF_VALUE: 8
PIF_VALUE: 13
PIF_VALUE: 15
PIF_VALUE: 15
PIF_VALUE: 9
PIF_VALUE: 16
PIF_VALUE: 15
PIF_VALUE: 1
PIF_VALUE: 14
PIF_VALUE: 15
PIF_VALUE: 15

## 2020-10-22 NOTE — PLAN OF CARE
Problem: Cardiac:  Goal: Ability to maintain an adequate cardiac output will improve  Description: Ability to maintain an adequate cardiac output will improve  Outcome: Ongoing  Goal: Hemodynamic stability will improve  Description: Hemodynamic stability will improve  Outcome: Ongoing     Problem: Fluid Volume:  Goal: Ability to achieve and maintain adequate urine output will improve  Description: Ability to achieve and maintain adequate urine output will improve  Outcome: Ongoing     Problem: Respiratory:  Goal: Respiratory status will improve  Description: Respiratory status will improve  Outcome: Ongoing     Problem: Safety:  Goal: Free from accidental physical injury  Description: Free from accidental physical injury  Outcome: Ongoing  Goal: Free from intentional harm  Description: Free from intentional harm  Outcome: Ongoing

## 2020-10-22 NOTE — ANESTHESIA PRE PROCEDURE
Department of Anesthesiology  Preprocedure Note       Name:  Gian Jean   Age:  78 y.o.  :  1950                                          MRN:  [id number]         Date:  10/22/2020      Surgeon: * No surgeons listed *    Procedure: * No procedures listed *    Medications prior to admission:   Prior to Admission medications    Medication Sig Start Date End Date Taking? Authorizing Provider   fluticasone (FLONASE) 50 MCG/ACT nasal spray 1 spray by Each Nostril route daily as needed for Allergies   Yes Historical Provider, MD   ALPRAZolam (XANAX) 0.25 MG tablet Take 0.25 mg by mouth 2 times daily as needed for Sleep or Anxiety.     Yes Historical Provider, MD   Turmeric (QC TUMERIC COMPLEX) 500 MG CAPS Take 1 tablet by mouth daily   Yes Historical Provider, MD   Multiple Vitamins-Minerals (THERAPEUTIC MULTIVITAMIN-MINERALS) tablet Take 1 tablet by mouth daily   Yes Historical Provider, MD   Melatonin 10 MG TABS Take 10 mg by mouth nightly   Yes Historical Provider, MD   Calcium-Magnesium-Vitamin D ER (CALCIUM 1200+D3) 600- MG-MG-UNIT TB24 Take 1 tablet by mouth daily   Yes Historical Provider, MD   omeprazole 20 MG EC tablet Take 20 mg by mouth daily    Yes Historical Provider, MD   metoprolol succinate (TOPROL XL) 25 MG extended release tablet Take 25 mg by mouth daily    Historical Provider, MD   atorvastatin (LIPITOR) 20 MG tablet Take 20 mg by mouth daily  10/26/17   Historical Provider, MD   aspirin 81 MG tablet Take 81 mg by mouth daily     Historical Provider, MD       Current medications:    Current Facility-Administered Medications   Medication Dose Route Frequency Provider Last Rate Last Dose    ALPRAZolam (XANAX) tablet 0.25 mg  0.25 mg Oral BID PRN Tru Mars MD   0.25 mg at 10/21/20 6719    aspirin EC tablet 81 mg  81 mg Oral Daily Angel Mars MD   81 mg at 10/22/20 0840    atorvastatin (LIPITOR) tablet 20 mg  20 mg Oral Nightly Tru Mars MD   20 mg at 10/21/20 9210  melatonin tablet 9 mg  9 mg Oral Nightly Angel Mars MD   9 mg at 10/21/20 2130    sodium chloride flush 0.9 % injection 10 mL  10 mL Intravenous 2 times per day Sherry Kay MD   10 mL at 10/22/20 0844    sodium chloride flush 0.9 % injection 10 mL  10 mL Intravenous PRN Sherry Kay MD        potassium chloride (KLOR-CON M) extended release tablet 40 mEq  40 mEq Oral PRN Sherry Kay MD        Or    potassium bicarb-citric acid (EFFER-K) effervescent tablet 40 mEq  40 mEq Oral PRN Sherry Kay MD        Or    potassium chloride 10 mEq/100 mL IVPB (Peripheral Line)  10 mEq Intravenous PRN Sherry Kay MD        magnesium sulfate 1 g in dextrose 5% 100 mL IVPB  1 g Intravenous PRN Sherry Kay MD        acetaminophen (TYLENOL) tablet 650 mg  650 mg Oral Q6H PRN Sherry Kay MD   650 mg at 10/21/20 1202    Or    acetaminophen (TYLENOL) suppository 650 mg  650 mg Rectal Q6H PRN Sherry Kay MD        magnesium hydroxide (MILK OF MAGNESIA) 400 MG/5ML suspension 30 mL  30 mL Oral Daily PRN Sherry Kay MD        promethazine (PHENERGAN) tablet 12.5 mg  12.5 mg Oral Q6H PRN Sherry Kay MD        Or    ondansetron (ZOFRAN) injection 4 mg  4 mg Intravenous Q6H PRN Angel Mars MD        famotidine (PEPCID) tablet 20 mg  20 mg Oral BID Angel Mars MD   20 mg at 10/22/20 0840    nicotine (NICODERM CQ) 21 MG/24HR 1 patch  1 patch Transdermal Daily PRN Sherry Kay MD        dilTIAZem 125 mg in dextrose 5 % 125 mL infusion  5 mg/hr Intravenous Continuous MargiDEYVI Kiser CNP   Stopped at 10/20/20 0424    enoxaparin (LOVENOX) injection 40 mg  40 mg Subcutaneous Daily Susana Mcgowan MD   40 mg at 10/21/20 5078     Facility-Administered Medications Ordered in Other Encounters   Medication Dose Route Frequency Provider Last Rate Last Dose    heparin (porcine) injection    PRN DEYVI Mcdaniels CRNA   10,000 Units at 10/22/20 1417    lactated ringers infusion    Continuous PRN Lelon Shimon, APRN - CRNA        lactated ringers infusion    Continuous PRN Lelon Shimon, APRN - CRNA        propofol injection    PRN Lelon Shimon, APRN - CRNA   60 mg at 10/22/20 1400    lidocaine 2 % injection    PRN Lelon Shimon, APRN - CRNA   100 mg at 10/22/20 1353    succinylcholine (ANECTINE) injection    PRN Lelon Shimon, APRN - CRNA   100 mg at 10/22/20 1354    dexamethasone (PF) (DECADRON) injection    PRN Lelon Shimon, APRN - CRNA   10 mg at 10/22/20 1410       Allergies: Allergies   Allergen Reactions    Seasonal        Problem List:    Patient Active Problem List   Diagnosis Code    Atrial fibrillation/flutter [address] [address] I48.91, I48.92    Atrial fibrillation with RVR (HCC) I48.91    Atrial fibrillation (HCC) I48.91       Past Medical History:        Diagnosis Date    Arthritis     Atrial fibrillation (HCC)     GERD (gastroesophageal reflux disease)     acid reflex    Hyperlipidemia        Past Surgical History:        Procedure Laterality Date    APPENDECTOMY      HERNIA REPAIR      NASAL SEPTUM SURGERY      NASAL SEPTUM SURGERY      when 23 yrs old    TONSILLECTOMY         Social History:    Social History     Tobacco Use    Smoking status: Passive Smoke Exposure - Never Smoker    Smokeless tobacco: Never Used   Substance Use Topics    Alcohol use:  Yes     Alcohol/week: 30.0 standard drinks     Types: 30 Cans of beer per week     Comment: 30 beers per week, occ scotch or tequila                                Counseling given: Not Answered      Vital Signs (Current):   Vitals:    10/22/20 0000 10/22/20 0400 10/22/20 0713 10/22/20 1105   BP: 113/72 119/67 112/70 115/75   Pulse: (!) 49 50 50 55   Resp: 18 18 18 18   Temp: 97.2 °F (36.2 °C) 97 °F (36.1 °C) 97.6 °F (36.4 °C) 97.5 °F (36.4 °C)   TempSrc: Temporal Temporal Oral Oral   SpO2: 99% 97% 97% 99%   Weight:  210 lb 9.6 oz (95.5 kg)     Height: asthma                           Cardiovascular:  Exercise tolerance: no interval change,   (+) dysrhythmias: atrial fibrillation and atrial flutter,     (-) past MI and CAD    ECG reviewed    Rate: normal      Cleared by cardiology              Neuro/Psych:      (-) CVA           GI/Hepatic/Renal:   (+) GERD:,           Endo/Other:        (-) diabetes mellitus               Abdominal:           Vascular:                                        Anesthesia Plan      general     ASA 3       Induction: intravenous. arterial line    Anesthetic plan and risks discussed with patient. Plan discussed with CRNA.     Attending anesthesiologist reviewed and agrees with Pre Eval content              Ramon Hannah DO   10/22/2020

## 2020-10-22 NOTE — PROGRESS NOTES
Progress note  [facility]    Adult Hospitalist      Name: Federico Lombard  MRN: [id number]     Acct: [de-identified]  Room: 2036/2036-01    Admit Date: 10/19/2020 10:31 PM  PCP: Arjun Patel MD    Primary Problem  Active Problems:    Atrial fibrillation [facility])  Resolved Problems:    * No resolved hospital problems. *        Assesment:     · Atrial fibrillation with rapid ventricular response  · Bradycardia  · Frequent PVCs and dizziness  · History of ventricular tachycardia  · Alcohol abuse  · Abnormal LFTs secondary to above        Plan:     · Admitted to CVU  · Augmentin oxygen saturation greater than 50%  · Serial troponin  · Metoprolol discontinued   · IV Lopressor as needed  · Cardiology consult, s/p ablation on 10/22/2020  · Start Xarelto  · Increase metoprolol  · Monitor LFTs  · DVT and GI prophylaxis. Discharge planning for tomorrow  Chief Complaint:     Chief Complaint   Patient presents with    Palpitations         History of Present Illness:        Patient seen and examined at bedside. Patient had ablation today   afebrile   Denies any chest pain, shortness of breath, palpitation, headache,  cough, cold, changes in urination or bowel habits      HPI:    Federico Lombard is a 78 y.o.  male who presents with Palpitations    53-year-old gentleman with past medical history of atrial fibrillation discharged on 10/19/2020 after being treated for atrial fibrillation presented to ER after 3 hours of his discharge. Patient stated that his heart rate was racing and he was complaining of palpitation. He checked his pulse and he noticed that his heart rate was variable from 50-80-1 30s. He stated that he discussed with his cardiologist for possible ablation. So he came back. In the hospital patient has frequent PVCs. Patient denies any chest pain, cough, cold, changes in urination, bowel habit or rash.   Return to the cardiology consult for further management  I have personally reviewed the reports that he is a non-smoker but has been exposed to tobacco smoke. He has never used smokeless tobacco.  Alcohol:      reports current alcohol use of about 30.0 standard drinks of alcohol per week. Drug Use:  reports no history of drug use.     Family History:     Family History   Problem Relation Age of Onset    Coronary Art Dis Mother     Coronary Art Dis Father     Heart Attack Father     Diabetes Sister     Cancer Sister          Physical Exam:     Vitals:  /75   Pulse 55   Temp 97.5 °F (36.4 °C) (Oral)   Resp 18   Ht 5' 11\" (1.803 m)   Wt 210 lb 9.6 oz (95.5 kg)   SpO2 99%   BMI 29.37 kg/m²   Temp (24hrs), Av.2 °F (35.7 °C), Min:92.7 °F (33.7 °C), Max:99.7 °F (37.6 °C)          General appearance - alert, well appearing, and in no acute distress  Mental status - oriented to person, place, and time with normal affect  Head - normocephalic and atraumatic  Eyes - pupils equal and reactive, extraocular eye movements intact, conjunctiva clear  Ears - hearing appears to be intact  Nose - no drainage noted  Mouth - mucous membranes moist  Neck - supple, no carotid bruits, thyroid not palpable  Chest - clear to auscultation, normal effort  Heart - normal rate, regular rhythm, no murmur  Abdomen - soft, nontender, nondistended, bowel sounds present all four quadrants, no masses, hepatomegaly or splenomegaly  Neurological - normal speech, no focal findings or movement disorder noted, cranial nerves II through XII grossly intact  Extremities - peripheral pulses palpable, no pedal edema or calf pain with palpation  Skin - no gross lesions, rashes, or induration noted        Data:     Labs:    Hematology:  Recent Labs     10/19/20  2240 10/21/20  0444 10/22/20  0438   WBC 7.2 5.9 6.2   RBC 4.67 4.38 4.31   HGB 15.9 14.6 14.3   HCT 46.9 44.7 43.8   .4 102.1 101.6   MCH 34.0* 33.3 33.2   MCHC 33.9 32.7 32.6   RDW 12.1 11.9 11.9    169 172   MPV 12.2 10.8 11.7   INR  --  1.1  -- Chemistry:  Recent Labs     10/19/20  2240 10/20/20  0102 10/21/20  0444 10/22/20  0438     --  140 140   K 4.0  --  5.0 4.2     --  104 106   CO2 23  --  30 28   GLUCOSE 99  --  118* 117*   BUN 16  --  17 15   CREATININE 0.93  --  0.99 0.96   ANIONGAP 13  --  6* 6*   LABGLOM >60  --  >60 >60   GFRAA >60  --  >60 >60   CALCIUM 10.2  --  9.8 9.8   TROPHS 13 14  --   --    MYOGLOBIN 46 41  --   --      No results for input(s): PROT, LABALBU, LABA1C, Q2ZNVSE, Z8MKAPL, FT4, TSH, AST, ALT, LDH, GGT, ALKPHOS, LABGGT, BILITOT, BILIDIR, AMMONIA, AMYLASE, LIPASE, LACTATE, CHOL, HDL, LDLCHOLESTEROL, CHOLHDLRATIO, TRIG, VLDL, VEU01JT, PHENYTOIN, PHENYF, URICACID, POCGLU in the last 72 hours. Lab Results   Component Value Date    INR 1.1 10/21/2020    INR 1.0 01/29/2020    PROTIME 13.6 10/21/2020    PROTIME 10.7 01/29/2020       No results found for: SPECIAL  No results found for: CULTURE    No results found for: POCPH, PHART, PH, POCPCO2, NMB7NJY, PCO2, POCPO2, PO2ART, PO2, POCHCO3, NXN5ZGF, HCO3, NBEA, PBEA, BEART, BE, THGBART, THB, GMI6BVQ, ZSNK3SKS, N8EWNVDQ, O2SAT, FIO2    Radiology:    Xr Chest Portable    Result Date: 10/19/2020  Unremarkable single portable AP upright view of the chest.     Xr Chest Portable    Result Date: 10/18/2020  Borderline cardiomegaly, without acute cardiopulmonary disease         All radiological studies reviewed                Code Status:  Full Code    Electronically signed by Evelia Saeed MD on 10/22/2020 at 4:53 PM     Copy sent to Dr. Emmy Roa MD    This note was created with the assistance of a speech-recognition program.  Although the intention is to generate a document that actually reflects the content of the visit, no guarantees can be provided that every mistake has been identified and corrected by editing. Note was updated later by me after  physical examination and  completion of the assessment.

## 2020-10-22 NOTE — ANESTHESIA POSTPROCEDURE EVALUATION
Department of Anesthesiology  Postprocedure Note    Patient: Wes Bernstein  MRN: [id number]  YOB: 1950  Date of evaluation: 10/22/2020  Time:  5:25 PM     Procedure Summary     Date:  10/22/20 Room / Location:  [facility]    Anesthesia Start:  1348 Anesthesia Stop:  1648    Procedure:  ABLATION Diagnosis:      Scheduled Providers:   Responsible Provider:  Sreedhar Hightower DO    Anesthesia Type:  general ASA Status:  3          Anesthesia Type: No value filed. Latonia Phase I: Latonia Score: 9    Latonia Phase II:      Last vitals: Reviewed and per EMR flowsheets.        Anesthesia Post Evaluation    Patient location during evaluation: PACU  Patient participation: complete - patient participated  Level of consciousness: awake and alert  Airway patency: patent  Nausea & Vomiting: no nausea and no vomiting  Complications: no  Cardiovascular status: hemodynamically stable  Respiratory status: acceptable  Hydration status: stable

## 2020-10-22 NOTE — PROGRESS NOTES
RN and [employer]vet Mail RN shaved patients groin area for procedure, call light within reach, sister sitting in room with patient

## 2020-10-22 NOTE — OP NOTE
Operative Note      Patient: Shira Rodríguez  YOB: 1950  MRN: [id number]    Date of Procedure: 10/22/2020  Pre-Op Diagnosis: Atrial fibrillation and flutter. Post-Op Diagnosis: Same   Estimated Blood Loss (mL): less than 50     Complications: None  Detailed Description of Procedure:   Electrophysiology study and cryoablation. HISTORY OF PRESENT ILLNESS:  The risks, indications, benefits, as well as alternatives to the procedure  were discussed with the patient. The risks include but are not limited to  bleeding, vessel perforation, AV fistula formation, rarely stroke, or other  imponderables. The patient understood and wished to proceed. All his  questions were answered to his satisfaction. TECHNIQUE:  The patient was brought to the electrophysiology lab in a fasting state and  hooked onto continuous hemodynamic monitoring. This includes continuous  pulse oximetry, telemetry, and continuous blood pressure recording. He  underwent general anesthesia as per the anesthesia team.  A preprocedure  transesophageal echocardiogram was not done because the patient was in  normal sinus rhythm. Using a modified Seldinger technique and vascular ultrasound, the right femoral vein was accessed 3 times and 3 sheaths were placed in the vein. A decapolar catheter was advanced to the coronary sinus,  and an intracardiac echocardiogram catheter was advanced to the right atrium. The ICE catheter was used to map the left atrium and also to visualize the  fossa ovalis for transseptal puncture. A long SL-1 sheath was then  advanced to the superior vena cava. Using fluoroscopy and intracardiac  echocardiography a single transseptal puncture was done. Heparin was given  immediately after transseptal puncture and ACT was maintained between 300-400sec. At the beginning of the procedure the patient was in normal sinus rhythm. KY interval was 156 ms, QRS duration 111, QT of 345, AH of 83, HV of 44.   The bidirectional block. Tolerated procedure well without any apparent complications. EBL-20ml    IMPRESSION:  1. Successful cryoablation of atrial fibrillation. 2.  Comprehensive electrophysiology study. 3.  A 3D mapping with an JAKE mapping system. 4.  Transseptal puncture. 5 ablation of left atrial posterior wall  6.   Ablation of typical isthmus dependent flutter  Plan  Start Xarelto for stroke prophylaxis  Continue medical therapy  Okay to discharge tomorrow  Electronically signed by Emmanuelle Mclaughlin MD on 10/22/2020 at 4:20 PM

## 2020-10-22 NOTE — PROGRESS NOTES
Patient to cath lab at this time per cath lab team for ablation, patient stable at time of transport.

## 2020-10-22 NOTE — PROGRESS NOTES
Patient returned to room 2036 from cath lab post ablation with Dr. Claudia Brandt. Patient is alert and oriented. Vitals as charted. Patient oriented to room and call light. Patient instructed to keep head and right leg flat - patient verbalizes understanding. Groin site is soft, no evidence of bleeding, bruising or hematoma, pressure dressing intact. Pedal pulses palpable. Will continue to assess and monitor.

## 2020-10-22 NOTE — PLAN OF CARE
Problem: Cardiac:  Goal: Hemodynamic stability will improve  Description: Hemodynamic stability will improve  Outcome: Ongoing     Problem: Safety:  Goal: Free from accidental physical injury  Description: Free from accidental physical injury  Outcome: Ongoing     Problem: Bleeding:  Goal: Will show no signs and symptoms of excessive bleeding  Description: Will show no signs and symptoms of excessive bleeding  Outcome: Ongoing

## 2020-10-23 VITALS
HEART RATE: 58 BPM | WEIGHT: 213.5 LBS | TEMPERATURE: 98.8 F | BODY MASS INDEX: 29.89 KG/M2 | OXYGEN SATURATION: 100 % | DIASTOLIC BLOOD PRESSURE: 69 MMHG | RESPIRATION RATE: 16 BRPM | SYSTOLIC BLOOD PRESSURE: 119 MMHG | HEIGHT: 71 IN

## 2020-10-23 LAB
ABSOLUTE EOS #: <0.03 K/UL (ref 0–0.44)
ABSOLUTE IMMATURE GRANULOCYTE: 0.04 K/UL (ref 0–0.3)
ABSOLUTE LYMPH #: 0.78 K/UL (ref 1.1–3.7)
ABSOLUTE MONO #: 0.53 K/UL (ref 0.1–1.2)
ANION GAP SERPL CALCULATED.3IONS-SCNC: 7 MMOL/L (ref 9–17)
BASOPHILS # BLD: 0 % (ref 0–2)
BASOPHILS ABSOLUTE: <0.03 K/UL (ref 0–0.2)
BUN BLDV-MCNC: 14 MG/DL (ref 8–23)
BUN/CREAT BLD: 14 (ref 9–20)
CALCIUM SERPL-MCNC: 9.1 MG/DL (ref 8.6–10.4)
CHLORIDE BLD-SCNC: 102 MMOL/L (ref 98–107)
CO2: 26 MMOL/L (ref 20–31)
CREAT SERPL-MCNC: 0.98 MG/DL (ref 0.7–1.2)
DIFFERENTIAL TYPE: ABNORMAL
EKG ATRIAL RATE: 61 BPM
EKG P AXIS: 43 DEGREES
EKG P-R INTERVAL: 168 MS
EKG Q-T INTERVAL: 448 MS
EKG QRS DURATION: 118 MS
EKG QTC CALCULATION (BAZETT): 450 MS
EKG R AXIS: 2 DEGREES
EKG VENTRICULAR RATE: 61 BPM
EOSINOPHILS RELATIVE PERCENT: 0 % (ref 1–4)
GFR AFRICAN AMERICAN: >60 ML/MIN
GFR NON-AFRICAN AMERICAN: >60 ML/MIN
GFR SERPL CREATININE-BSD FRML MDRD: ABNORMAL ML/MIN/{1.73_M2}
GFR SERPL CREATININE-BSD FRML MDRD: ABNORMAL ML/MIN/{1.73_M2}
GLUCOSE BLD-MCNC: 167 MG/DL (ref 70–99)
HCT VFR BLD CALC: 40 % (ref 40.7–50.3)
HEMOGLOBIN: 13.4 G/DL (ref 13–17)
IMMATURE GRANULOCYTES: 0 %
LYMPHOCYTES # BLD: 9 % (ref 24–43)
MCH RBC QN AUTO: 33.8 PG (ref 25.2–33.5)
MCHC RBC AUTO-ENTMCNC: 33.5 G/DL (ref 28.4–34.8)
MCV RBC AUTO: 101 FL (ref 82.6–102.9)
MONOCYTES # BLD: 6 % (ref 3–12)
NRBC AUTOMATED: 0 PER 100 WBC
PDW BLD-RTO: 11.9 % (ref 11.8–14.4)
PLATELET # BLD: 153 K/UL (ref 138–453)
PLATELET ESTIMATE: ABNORMAL
PMV BLD AUTO: 11.2 FL (ref 8.1–13.5)
POTASSIUM SERPL-SCNC: 4.2 MMOL/L (ref 3.7–5.3)
RBC # BLD: 3.96 M/UL (ref 4.21–5.77)
RBC # BLD: ABNORMAL 10*6/UL
SEG NEUTROPHILS: 85 % (ref 36–65)
SEGMENTED NEUTROPHILS ABSOLUTE COUNT: 7.82 K/UL (ref 1.5–8.1)
SODIUM BLD-SCNC: 135 MMOL/L (ref 135–144)
WBC # BLD: 9.2 K/UL (ref 3.5–11.3)
WBC # BLD: ABNORMAL 10*3/UL

## 2020-10-23 PROCEDURE — 93005 ELECTROCARDIOGRAM TRACING: CPT | Performed by: INTERNAL MEDICINE

## 2020-10-23 PROCEDURE — 80048 BASIC METABOLIC PNL TOTAL CA: CPT

## 2020-10-23 PROCEDURE — 6370000000 HC RX 637 (ALT 250 FOR IP): Performed by: INTERNAL MEDICINE

## 2020-10-23 PROCEDURE — 36415 COLL VENOUS BLD VENIPUNCTURE: CPT

## 2020-10-23 PROCEDURE — 85025 COMPLETE CBC W/AUTO DIFF WBC: CPT

## 2020-10-23 PROCEDURE — 2580000003 HC RX 258: Performed by: INTERNAL MEDICINE

## 2020-10-23 RX ADMIN — SODIUM CHLORIDE, PRESERVATIVE FREE 10 ML: 5 INJECTION INTRAVENOUS at 09:33

## 2020-10-23 RX ADMIN — FAMOTIDINE 20 MG: 20 TABLET ORAL at 09:34

## 2020-10-23 RX ADMIN — ASPIRIN 81 MG: 81 TABLET, COATED ORAL at 09:34

## 2020-10-23 RX ADMIN — ALPRAZOLAM 0.25 MG: 0.25 TABLET ORAL at 04:45

## 2020-10-23 RX ADMIN — ACETAMINOPHEN 650 MG: 325 TABLET ORAL at 09:38

## 2020-10-23 RX ADMIN — ACETAMINOPHEN 650 MG: 325 TABLET ORAL at 04:44

## 2020-10-23 ASSESSMENT — PAIN SCALES - GENERAL
PAINLEVEL_OUTOF10: 4
PAINLEVEL_OUTOF10: 3
PAINLEVEL_OUTOF10: 0
PAINLEVEL_OUTOF10: 0
PAINLEVEL_OUTOF10: 3

## 2020-10-23 ASSESSMENT — PAIN DESCRIPTION - FREQUENCY: FREQUENCY: INTERMITTENT

## 2020-10-23 ASSESSMENT — PAIN DESCRIPTION - DESCRIPTORS: DESCRIPTORS: DISCOMFORT

## 2020-10-23 ASSESSMENT — PAIN - FUNCTIONAL ASSESSMENT: PAIN_FUNCTIONAL_ASSESSMENT: ACTIVITIES ARE NOT PREVENTED

## 2020-10-23 ASSESSMENT — PAIN DESCRIPTION - ONSET: ONSET: GRADUAL

## 2020-10-23 ASSESSMENT — PAIN DESCRIPTION - ORIENTATION: ORIENTATION: MID

## 2020-10-23 ASSESSMENT — PAIN DESCRIPTION - PROGRESSION: CLINICAL_PROGRESSION: NOT CHANGED

## 2020-10-23 ASSESSMENT — PAIN DESCRIPTION - PAIN TYPE: TYPE: ACUTE PAIN

## 2020-10-23 ASSESSMENT — PAIN DESCRIPTION - LOCATION: LOCATION: CHEST

## 2020-10-23 NOTE — PLAN OF CARE
Problem: Cardiac:  Goal: Ability to maintain an adequate cardiac output will improve  Description: Ability to maintain an adequate cardiac output will improve  Outcome: Ongoing  Goal: Hemodynamic stability will improve  Description: Hemodynamic stability will improve  10/23/2020 0356 by Monika Anderson RN  Outcome: Ongoing  10/22/2020 1906 by Cathleen Ziegler RN  Outcome: Ongoing

## 2020-10-23 NOTE — PROGRESS NOTES
Progress note  [facility].,    Adult Hospitalist      Name: Deyanira Kim  MRN: [id number]     Acct: [de-identified]  Room: [facility]/1007-02    Admit Date: 10/19/2020 10:31 PM  PCP: Herminio Urena MD    Primary Problem  Active Problems:    Atrial fibrillation [facility])  Resolved Problems:    * No resolved hospital problems. *        Assesment:     · Atrial fibrillation with rapid ventricular response  · Bradycardia  · Frequent PVCs and dizziness  · History of ventricular tachycardia  · Alcohol abuse  · Abnormal LFTs secondary to above        Plan:     · Patient transferred to progressive unit  · Augmentin oxygen saturation greater than 50%  · Serial troponin  · Metoprolol discontinued   · IV Lopressor as needed  · Cardiology consult, s/p ablation on 10/22/2020  · Start Xarelto  · Increase metoprolol  · Monitor LFTs  · DVT and GI prophylaxis. Discharge planning for today if okay with cardiology  Chief Complaint:     Chief Complaint   Patient presents with    Palpitations         History of Present Illness:        Patient seen and examined at bedside, patient denies any chest pain no nausea vomiting diarrhea  Headache no palpitation  Focal logical deficit  Overall patient is feeling better  Awaiting for cardiology to review, overall patient is feeling better at likely can be discharged later today    HPI:    Deyanira Kim is a 78 y.o.  male who presents with Palpitations    62-year-old gentleman with past medical history of atrial fibrillation discharged on 10/19/2020 after being treated for atrial fibrillation presented to ER after 3 hours of his discharge. Patient stated that his heart rate was racing and he was complaining of palpitation. He checked his pulse and he noticed that his heart rate was variable from 50-80-1 30s. He stated that he discussed with his cardiologist for possible ablation. So he came back. In the hospital patient has frequent PVCs.   Patient denies any chest pain, cough, cold, changes in urination, bowel habit or rash. Return to the cardiology consult for further management  I have personally reviewed the past medical history, past surgical history, medications, social history, and family history, and summarized in the note. Review of Systems:     All 10 point system is reviewed and negative otherwise mentioned in HPI. Past Medical History:     Past Medical History:   Diagnosis Date    Arthritis     Atrial fibrillation (HCC)     GERD (gastroesophageal reflux disease)     acid reflex    Hyperlipidemia         Past Surgical History:     Past Surgical History:   Procedure Laterality Date    APPENDECTOMY      HERNIA REPAIR      NASAL SEPTUM SURGERY      NASAL SEPTUM SURGERY      when 23 yrs old    TONSILLECTOMY          Medications Prior to Admission:       Prior to Admission medications    Medication Sig Start Date End Date Taking? Authorizing Provider   fluticasone (FLONASE) 50 MCG/ACT nasal spray 1 spray by Each Nostril route daily as needed for Allergies   Yes Historical Provider, MD   ALPRAZolam (XANAX) 0.25 MG tablet Take 0.25 mg by mouth 2 times daily as needed for Sleep or Anxiety.     Yes Historical Provider, MD   Turmeric (QC TUMERIC COMPLEX) 500 MG CAPS Take 1 tablet by mouth daily   Yes Historical Provider, MD   Multiple Vitamins-Minerals (THERAPEUTIC MULTIVITAMIN-MINERALS) tablet Take 1 tablet by mouth daily   Yes Historical Provider, MD   Melatonin 10 MG TABS Take 10 mg by mouth nightly   Yes Historical Provider, MD   Calcium-Magnesium-Vitamin D ER (CALCIUM 1200+D3) 600- MG-MG-UNIT TB24 Take 1 tablet by mouth daily   Yes Historical Provider, MD   omeprazole 20 MG EC tablet Take 20 mg by mouth daily    Yes Historical Provider, MD   metoprolol succinate (TOPROL XL) 25 MG extended release tablet Take 25 mg by mouth daily    Historical Provider, MD   atorvastatin (LIPITOR) 20 MG tablet Take 20 mg by mouth daily  10/26/17   Historical Provider, MD 102.1 101.6 101.0   MCH 33.3 33.2 33.8*   MCHC 32.7 32.6 33.5   RDW 11.9 11.9 11.9    172 153   MPV 10.8 11.7 11.2   INR 1.1  --   --      Chemistry:  Recent Labs     10/21/20  0444 10/22/20  0438 10/23/20  0453    140 135   K 5.0 4.2 4.2    106 102   CO2 30 28 26   GLUCOSE 118* 117* 167*   BUN 17 15 14   CREATININE 0.99 0.96 0.98   ANIONGAP 6* 6* 7*   LABGLOM >60 >60 >60   GFRAA >60 >60 >60   CALCIUM 9.8 9.8 9.1     No results for input(s): PROT, LABALBU, LABA1C, R5BPQCO, X6XKTNU, FT4, TSH, AST, ALT, LDH, GGT, ALKPHOS, LABGGT, BILITOT, BILIDIR, AMMONIA, AMYLASE, LIPASE, LACTATE, CHOL, HDL, LDLCHOLESTEROL, CHOLHDLRATIO, TRIG, VLDL, MMM53VO, PHENYTOIN, PHENYF, URICACID, POCGLU in the last 72 hours. Lab Results   Component Value Date    INR 1.1 10/21/2020    INR 1.0 01/29/2020    PROTIME 13.6 10/21/2020    PROTIME 10.7 01/29/2020       No results found for: SPECIAL  No results found for: CULTURE    No results found for: POCPH, PHART, PH, POCPCO2, WZI2NWP, PCO2, POCPO2, PO2ART, PO2, POCHCO3, BJN6DDL, HCO3, NBEA, PBEA, BEART, BE, THGBART, THB, XGO9NZA, QCRL4MAD, Y0UMFNMD, O2SAT, FIO2    Radiology:    Xr Chest Portable    Result Date: 10/19/2020  Unremarkable single portable AP upright view of the chest.     Xr Chest Portable    Result Date: 10/18/2020  Borderline cardiomegaly, without acute cardiopulmonary disease         All radiological studies reviewed                Code Status:  Full Code    Electronically signed by James Wells MD on 10/23/2020 at 11:30 AM     Copy sent to Dr. Antonia Rivera MD    This note was created with the assistance of a speech-recognition program.  Although the intention is to generate a document that actually reflects the content of the visit, no guarantees can be provided that every mistake has been identified and corrected by editing. Note was updated later by me after  physical examination and  completion of the assessment.

## 2020-10-23 NOTE — FLOWSHEET NOTE
Patient complains of slight N&T bilateral hands and feet    Patient assessed, RN at [facility]. Will continue to monitor closely. 2015  RN at [facility]  N&T resolved after ROM preformed.

## 2020-10-23 NOTE — CARE COORDINATION
Discharge planning    Patient chart reviewed and appreciate prior cm notes. CM made a follow up and in the dc plans. .Made with Dr. Jake Voss Friday October 30 at 8:30- patient . . has cane, crutches and bath bench at home. . lives with wife. Patient s/p ablation for  a fib. .     Patient was started on xarelto 20 mg daily. Will need to have rx to call in to assess for cost and need for PA.

## 2020-10-23 NOTE — FLOWSHEET NOTE
10/22/20 1956   Puncture Site Assessment 1   Location Femoral - right   Site Assessment Oozing   Hemostasis Intervention Manual pressure  (held for 20 minutes)   Dressing Applied Bandaid;Pressure dressing     Per orders, sutures to be removed after 3 hours bed rest.    Sutures removed at this time. Oozing noted, manual pressure held for 20 minutes. Oozing controlled, 2x2 with bandaid and pressure tape applied. Will continue to monitor closely.

## 2020-10-23 NOTE — PROGRESS NOTES
CLINICAL PHARMACY NOTE: MEDS TO [address] Select Patient?: No  Total # of Prescriptions Filled: 1   The following medications were delivered to the patient:  · XARELTO 20MG  Total # of Interventions Completed: 0  Time Spent (min): 15    Additional Documentation:

## 2020-10-23 NOTE — FLOWSHEET NOTE
Patient ambulated to restroom at this time post cardiac ablation. No complications noted to site and distal pulses remain palpable. Pt denies all complaints at this time. See flowsheets for further information.

## 2020-10-23 NOTE — PLAN OF CARE
Problem: Safety:  Goal: Free from accidental physical injury  Description: Free from accidental physical injury  10/23/2020 0354 by Mariangel Allen RN  Outcome: Ongoing  10/23/2020 0354 by Mariangel Allen RN  Outcome: Ongoing  10/22/2020 1906 by Jesus Hays RN  Outcome: Ongoing  Goal: Free from intentional harm  Description: Free from intentional harm  10/23/2020 0354 by Mariangel Allen RN  Outcome: Ongoing  10/23/2020 0354 by Mariangel Allen RN  Outcome: Ongoing     Problem: Respiratory:  Goal: Respiratory status will improve  Description: Respiratory status will improve  Outcome: Ongoing

## 2020-10-23 NOTE — FLOWSHEET NOTE
Telephone report given to [facility]. Patient transferred to [phone] with unit HUC/PCT and RN with all personal belongings.

## 2020-10-23 NOTE — CARE COORDINATION
Discharge planning    Patient to go on xarelto 20 mg daily. Obtained verbal order to call in to check to see if PA needed and for cost analysis. Call to liz at [phone] . Yash Part does not need PA but cost will be 468  per month . Cost will be that high due to donut hole. .     Discussed with patient and agreeable to free month and following up with dr Maricel Rahman at office regarding cost and possible transition to coumadin     First month rx sent to [facility] with voucher to fill for free.  Updated taty CARY    Added to dc instructions

## 2020-10-23 NOTE — PROGRESS NOTES
Discharge instructions explained to pt. Prescription from meds to beds delivered to room. Pt verbalized understanding. Awaiting ride home.

## 2020-10-24 ENCOUNTER — CARE COORDINATION (OUTPATIENT)
Dept: CASE MANAGEMENT | Age: 70
End: 2020-10-24

## 2020-10-24 PROCEDURE — 93010 ELECTROCARDIOGRAM REPORT: CPT | Performed by: INTERNAL MEDICINE

## 2020-10-26 ENCOUNTER — CARE COORDINATION (OUTPATIENT)
Dept: CASE MANAGEMENT | Age: 70
End: 2020-10-26

## 2020-10-26 NOTE — CARE COORDINATION
[facility] Transitions Initial Follow Up Call- COVID risk follow up call        Call within 2 business days of discharge: Yes    Patient: Justine Marie Patient : 1950   MRN: [id number]  Reason for Admission: A fib with RVR   Discharge Date: 10/23/20 RARS: Readmission Risk Score: 12      Last Discharge [facility]       Complaint Diagnosis Description Type Department Provider    10/19/20 Palpitations Atrial fibrillation, unspecified type [facility]) ED to Hosp-Admission (Discharged) (ADMITTED) ELSIE Hyde MD           Spoke with: La Nena Cisneros    Call to pt who states he is doing \"not too bad\". States he feels palpitations/ flutters from time to time but the HR remains in the 60's-70's range. States understanding to return to the hospital or call 911 if HR goes above 100 and remains there or if CP/ SOB  Denies SOB, CP, fever/ chills  States he doesn't think he is in the donut hole (Medicare) since he has new insurance since Sept- writer advises calling insurance to check this out and inquire about preferred [facility] meds (Tier 1, etc)- v/u. States he is going to call the pharmacy to make sure they have the correct insurance information       Challenges to be reviewed by the provider   Additional needs identified to be addressed with provider No  none    Discussed COVID-19 related testing which was available at this time. Test results were negative. Patient informed of results, if available? Yes         Method of communication with provider : none    Advance Care Planning:   Does patient have an Advance Directive:  not on file; education provided. Was this a readmission? Yes  Patient stated reason for admission: A fib  Patients top risk factors for readmission: lack of knowledge about disease and medical condition    Care Transition Nurse (CTN) contacted the patient by telephone to perform post hospital discharge assessment. Verified name and  with patient as identifiers.  Provided introduction to self, and explanation of the CTN role. CTN reviewed discharge instructions, medical action plan and red flags with patient who verbalized understanding. Patient given an opportunity to ask questions and does not have any further questions or concerns at this time. Were discharge instructions available to patient? Yes. Reviewed appropriate site of care based on symptoms and resources available to patient including: PCP, Specialist, Urgent care clinics, When to call 911 and CTN . The patient agrees to contact the PCP office for questions related to their healthcare. Medication reconciliation was performed with patient, who verbalizes understanding of administration of home medications. Advised obtaining a 90-day supply of all daily and as-needed medications. Covid Risk Education    Patient has following risk factors of: no known risk factors. Education provided regarding infection prevention, and signs and symptoms of COVID-19 and when to seek medical attention with patient who verbalized understanding. Discussed exposure protocols and quarantine From CDC: Are you at higher risk for severe illness?   and given an opportunity for questions and concerns. The patient agrees to contact the COVID-19 hotline [phone] or PCP office for questions related to COVID-19. For more information on steps you can take to protect yourself, see CDC's How to Protect Yourself     Patient/family/caregiver given information for Kapil Blum and agrees to enroll yes  Patient's preferred e-mail: [email or web address]  Patient's preferred phone number: [phone]    Discussed follow-up appointments. If no appointment was previously scheduled, appointment scheduling offered: Yes. Is follow up appointment scheduled within 7 days of discharge? Yes  Non-[facility] follow up appointment(s): Dr Sue Toledo 10/27, Anu Scruggs 10/30 at 0830    will be followed by LOOP based on severity of symptoms and risk factors.   CTN provided contact information for future needs. Facility: [facility]     Non-face-to-face services provided:  Scheduled appointment with PCP-confirmed appt 10/27  Scheduled appointment with Specialist-confirmed appt don Mcleod 10/30  Obtained and reviewed discharge summary and/or continuity of care documents  Assessment and support for treatment adherence and medication management-confirms he has xarelto and states understanding how to take this- see note about cost    Care Transitions 24 Hour Call    Do you have any ongoing symptoms?:  No  Do you have a copy of your discharge instructions?:  Yes  Do you have all of your prescriptions and are they filled?:  Yes  Have you been contacted by a [facility] Pharmacist?:  No  Have you scheduled your follow up appointment?:  Yes  How are you going to get to your appointment?:  Car - drive self  Were you discharged with any Home Care or Post Acute Services:  No  Do you feel like you have everything you need to keep you well at home?:  Yes  Care Transitions Interventions         Follow Up  No future appointments.     Greg Williamson RN

## 2020-10-27 LAB
EKG ATRIAL RATE: 71 BPM
EKG P AXIS: 47 DEGREES
EKG P-R INTERVAL: 146 MS
EKG Q-T INTERVAL: 414 MS
EKG QRS DURATION: 108 MS
EKG QTC CALCULATION (BAZETT): 449 MS
EKG R AXIS: 38 DEGREES
EKG T AXIS: 28 DEGREES
EKG VENTRICULAR RATE: 71 BPM

## 2020-10-30 NOTE — DISCHARGE SUMMARY
[facility]    Adult Hospitalist      Patient ID: Kalie Staples  MRN: [id number]     Acct:  [de-identified]       Patient's PCP: Rylan Grace MD    Admit Date: 10/19/2020     Discharge Date: 10/23/2020      Admitting Physician: Jose F Mathur MD    Discharge Physician: Emmanuel Curtis MD     CONSULTANTS: Patient Care Team:  Rylan Grace MD as PCP - General  [place] Boro Awkward, RN as Care Transitions Nurse      Active Discharge Diagnoses:  · Atrial fibrillation with rapid ventricular response  · Bradycardia  · Frequent PVCs and dizziness  · History of ventricular tachycardia  · Alcohol abuse  · Abnormal LFTs secondary to above      Hospital Course:  Kalie Staples is a 78 y.o.  male who presents with Palpitations     29-year-old gentleman with past medical history of atrial fibrillation discharged on 10/19/2020 after being treated for atrial fibrillation presented to ER after 3 hours of his discharge. Patient stated that his heart rate was racing and he was complaining of palpitation. He checked his pulse and he noticed that his heart rate was variable from 50-80-1 30s. He stated that he discussed with his cardiologist for possible ablation. So he came back. In the hospital patient has frequent PVCs. Patient denies any chest pain, cough, cold, changes in urination, bowel habit or rash.   Return to the cardiology consult for further management,   Patient was admitted for atrial fibrillation with RVR, further patient noticed to have frequent PVCs, as you know patient was noticed to have a deposition with RVR cardiology evaluate the patient patient underwent status post ablation on 10/22/2020, patient metoprolol was discontinued, kept on IV Lopressor as needed, patient was started on Xarelto, and further metoprolol was introduced with an increasing dose, over the course patient did well, now is feeling better heart rate is well controlled, patient is discharged from the hospital in stable condition history, very close follow-up with cardiology anticoagulation as an outpatient      The plan was discussed in detail with patient who agreed with the plan and verbalized understanding . The patient was seen and examined on day of discharge and this discharge summary is in conjunction with any daily progress note from day of discharge. Hospital Data:    Labs:    Hematology:No results for input(s): WBC, RBC, HGB, HCT, MCV, MCH, MCHC, RDW, PLT, MPV, SEDRATE, CRP, INR, DDIMER, UB0QNYIP, LABABSO in the last 72 hours. Invalid input(s): PT  Chemistry:No results for input(s): NA, K, CL, CO2, GLUCOSE, BUN, CREATININE, MG, ANIONGAP, LABGLOM, GFRAA, CALCIUM, CAION, PHOS, PSA, PROBNP, TROPHS, CKTOTAL, CKMB, CKMBINDEX, MYOGLOBIN, DIGOXIN, LACTACIDWB in the last 72 hours. No results for input(s): PROT, LABALBU, LABA1C, H9BUMMC, M1TZSAF, FT4, TSH, AST, ALT, LDH, GGT, ALKPHOS, LABGGT, BILITOT, BILIDIR, AMMONIA, AMYLASE, LIPASE, LACTATE, CHOL, HDL, LDLCHOLESTEROL, CHOLHDLRATIO, TRIG, VLDL, XRH31VQ, PHENYTOIN, PHENYF, URICACID, POCGLU in the last 72 hours. Lab Results   Component Value Date    INR 1.1 10/21/2020    INR 1.0 01/29/2020    PROTIME 13.6 10/21/2020    PROTIME 10.7 01/29/2020     No results found for: SPECIAL  No results found for: CULTURE    No results found for: POCPH, PHART, PH, POCPCO2, GZY6NKO, PCO2, POCPO2, PO2ART, PO2, POCHCO3, VFP6CWQ, HCO3, NBEA, PBEA, BEART, BE, THGBART, THB, MQJ4YMY, BJWW7OKF, S7RYBRNQ, O2SAT, FIO2    Radiology:    No results found. All radiological studies reviewed      Reviews of Symptoms:    A 10 point system is reviewed and  negative except described in hospital course    Physical Exam:    Vitals:  /69   Pulse 58   Temp 98.8 °F (37.1 °C) (Oral)   Resp 16   Ht 5' 11\" (1.803 m)   Wt 213 lb 8 oz (96.8 kg)   SpO2 100%   BMI 29.78 kg/m²   No data recorded.       General appearance - alert, well appearing, and in no acute distress  Mental status - oriented to person, place, and time with normal affect  Head - normocephalic and atraumatic  Eyes - pupils equal and reactive, extraocular eye movements intact, conjunctiva clear  Ears - hearing appears to be intact  Nose - no drainage noted  Mouth - mucous membranes moist  Neck - supple, no carotid bruits, thyroid not palpable  Chest - clear to auscultation, normal effort  Heart - normal rate, regular rhythm, no murmur  Abdomen - soft, nontender, nondistended, bowel sounds present all four quadrants, no masses, hepatomegaly or splenomegaly  Neurological - normal speech, no focal findings or movement disorder noted, cranial nerves II through XII grossly intact  Extremities - peripheral pulses palpable, no pedal edema or calf pain with palpation  Skin - no gross lesions, rashes, or induration noted      Consults:  IP CONSULT TO INTERNAL MEDICINE  IP CONSULT TO CARDIOLOGY    Disposition: Home    Discharged Condition: Stable    Follow Up: Tonia Maguire MD  [address]  [phone]    On 10/30/2020  Appointment time 8:30 Please bring photo ID, Insurance card, and wear mask    Herminio Urena MD  [address] [place] [address]  [phone]    In 1 week              Diet: No diet orders on file    Discharge Medications:    [place] \"Jesu\"   Home Medication Instructions XCO:[id number]    Printed on:10/30/20 0706   Medication Information                      ALPRAZolam (XANAX) 0.25 MG tablet  Take 0.25 mg by mouth 2 times daily as needed for Sleep or Anxiety.               aspirin 81 MG tablet  Take 81 mg by mouth daily              atorvastatin (LIPITOR) 20 MG tablet  Take 20 mg by mouth daily              Calcium-Magnesium-Vitamin D ER (CALCIUM 1200+D3) 600- MG-MG-UNIT TB24  Take 1 tablet by mouth daily             fluticasone (FLONASE) 50 MCG/ACT nasal spray  1 spray by Each Nostril route daily as needed for Allergies Melatonin 10 MG TABS  Take 10 mg by mouth nightly             metoprolol succinate (TOPROL XL) 25 MG extended release tablet  Take 25 mg by mouth daily             Multiple Vitamins-Minerals (THERAPEUTIC MULTIVITAMIN-MINERALS) tablet  Take 1 tablet by mouth daily             omeprazole 20 MG EC tablet  Take 20 mg by mouth daily              rivaroxaban 15 & 20 MG Starter Pack  Take as directed on package. Turmeric (QC TUMERIC COMPLEX) 500 MG CAPS  Take 1 tablet by mouth daily                 Code Status:  Prior    Time Spent on discharge is  35 mins in patient examination, evaluation, counseling as well as medication reconciliation, prescriptions for required medications, discharge plan and follow up. Electronically signed by Emmanuel Curtis MD on 10/30/2020 at 1:16 PM     Thank you Dr. Rylan Grace MD for the opportunity to be involved in this patient's care. This note was created with the assistance of a speech-recognition program.  Although the intention is to generate a document that actually reflects the content of the visit, no guarantees can be provided that every mistake has been identified and corrected by editing. Note was updated later by me after  physical examination and  completion of the assessment.

## 2020-11-02 ENCOUNTER — CARE COORDINATION (OUTPATIENT)
Dept: CARE COORDINATION | Age: 70
End: 2020-11-02

## 2020-11-02 NOTE — CARE COORDINATION
RN responded to question in loop from patient:    Mina Otoole, 8:15 AM   Last night I had an episode where my heart rate increased to above 90 bpm. I took a alprazolam and called the nurse. After about 30 minutes my heart rate was back to about 75 bpm. There were no other issues throughout the night. Also, I have not had a solid bowel movement for several days. Is this a side effect from my metoprolol? I can discuss this with Ilene Duncan during her visit tomorrow. RN spoke with patient who is well educated on his symptoms. He has an appointment tomorrow with PCP. ACM recommended continued monitoring of his heart rate, increase fluids to avoid dehydration, try OTC medications for loose stools and keep appointment with PCP. He was instructed to seek emergent help if vitals become unstable. PCP was not notified of alert due to no worsening concerns. Patient encouraged to call PCP if symptoms worsen.

## 2020-11-03 PROBLEM — I48.91 ATRIAL FIBRILLATION (HCC): Status: RESOLVED | Noted: 2020-10-19 | Resolved: 2020-11-03

## 2020-11-04 ENCOUNTER — CARE COORDINATION (OUTPATIENT)
Dept: CARE COORDINATION | Age: 70
End: 2020-11-04

## 2020-11-04 NOTE — CARE COORDINATION
RN received message in loop from patient:  Javier Farmer, 7:36 AM   I had another episode last night at about 7pm where my heart rate rapidly increased from about 70 bpm to over 110 bpm and then settled between  for several minutes. Over about an hour it decreased back to normal (70 - 75 bpm). I had taken a Xanax at 5pm. I took my metoprolol and Xarelto at about 7:30pm. I plan to call Dr. Trista Garner today to inform him as this is the second episodein 3 days. Prabha Ledesma RN, 8:07 AM   Javier Farmer, thank you for checking in with us. I think calling Dr Jake Voss with an update is a good idea. Were you up moving around when your heart rate increased? Are you still having loose stools? Remember to stay hydrated. Continue to monitor your symptoms and report to loop. If you would like to speak with a nurse, please let us know.  Hazel Cortes RN

## 2020-11-05 ENCOUNTER — CARE COORDINATION (OUTPATIENT)
Dept: CARE COORDINATION | Age: 70
End: 2020-11-05

## 2020-11-05 NOTE — CARE COORDINATION
Message in loop from patient:  Negra Caldwell, 6:25 PM   I was actually laying in bed when my heart rate increased. Stools are getting more solid. I had plain yogurt w/ a probiotic cereal and bran for breakfast and am drinking a lot of water. Jamal Syed RN, 7:49 AM   Good morning, did you call to report your concerns to Dr. Lulu Sheridan? It is important to follow up with your primary care physician as well. Hope you continue to improve each day, LUIS DANIEL Hdez, 7:44 AM   Not a question or concern, but just wanted to share my numbers this morning.  /75 Heart rate 58 bowel movements are improving - too much information :)

## 2020-11-11 ENCOUNTER — CARE COORDINATION (OUTPATIENT)
Dept: CARE COORDINATION | Age: 70
End: 2020-11-11

## 2021-06-10 ENCOUNTER — HOSPITAL ENCOUNTER (OUTPATIENT)
Age: 71
Setting detail: SPECIMEN
Discharge: HOME | End: 2021-06-10
Payer: MEDICARE

## 2021-06-10 LAB
ALBUMIN SERPL-MCNC: 4.3 G/DL (ref 3.5–5.2)
ALBUMIN/GLOBULIN RATIO: 1.5 (ref 1–2.5)
ALP BLD-CCNC: 63 U/L (ref 40–129)
ALT SERPL-CCNC: 33 U/L (ref 5–41)
ANION GAP SERPL CALCULATED.3IONS-SCNC: 12 MMOL/L (ref 9–17)
AST SERPL-CCNC: 37 U/L
BILIRUB SERPL-MCNC: 0.82 MG/DL (ref 0.3–1.2)
BUN BLDV-MCNC: 14 MG/DL (ref 8–23)
BUN/CREAT BLD: ABNORMAL (ref 9–20)
C-REACTIVE PROTEIN: <3 MG/L (ref 0–5)
CALCIUM SERPL-MCNC: 8.9 MG/DL (ref 8.6–10.4)
CHLORIDE BLD-SCNC: 101 MMOL/L (ref 98–107)
CHOLESTEROL/HDL RATIO: 2.3
CHOLESTEROL: 158 MG/DL
CO2: 26 MMOL/L (ref 20–31)
CREAT SERPL-MCNC: 0.96 MG/DL (ref 0.7–1.2)
GFR AFRICAN AMERICAN: >60 ML/MIN
GFR NON-AFRICAN AMERICAN: >60 ML/MIN
GFR SERPL CREATININE-BSD FRML MDRD: ABNORMAL ML/MIN/{1.73_M2}
GFR SERPL CREATININE-BSD FRML MDRD: ABNORMAL ML/MIN/{1.73_M2}
GLUCOSE BLD-MCNC: 106 MG/DL (ref 70–99)
HCT VFR BLD CALC: 42.1 % (ref 40.7–50.3)
HDLC SERPL-MCNC: 69 MG/DL
HEMOGLOBIN: 13.5 G/DL (ref 13–17)
LDL CHOLESTEROL: 76 MG/DL (ref 0–130)
MCH RBC QN AUTO: 32.8 PG (ref 25.2–33.5)
MCHC RBC AUTO-ENTMCNC: 32.1 G/DL (ref 28.4–34.8)
MCV RBC AUTO: 102.2 FL (ref 82.6–102.9)
NRBC AUTOMATED: 0 PER 100 WBC
PDW BLD-RTO: 13.3 % (ref 11.8–14.4)
PLATELET # BLD: ABNORMAL K/UL (ref 138–453)
PLATELET, FLUORESCENCE: NORMAL K/UL (ref 138–453)
PLATELET, IMMATURE FRACTION: NORMAL % (ref 1.1–10.3)
PMV BLD AUTO: ABNORMAL FL (ref 8.1–13.5)
POTASSIUM SERPL-SCNC: 4.7 MMOL/L (ref 3.7–5.3)
RBC # BLD: 4.12 M/UL (ref 4.21–5.77)
SODIUM BLD-SCNC: 139 MMOL/L (ref 135–144)
TOTAL PROTEIN: 7.2 G/DL (ref 6.4–8.3)
TRIGL SERPL-MCNC: 63 MG/DL
VLDLC SERPL CALC-MCNC: NORMAL MG/DL (ref 1–30)
WBC # BLD: 6.9 K/UL (ref 3.5–11.3)

## 2022-06-01 ENCOUNTER — HOSPITAL ENCOUNTER (OUTPATIENT)
Age: 72
Setting detail: SPECIMEN
Discharge: HOME | End: 2022-06-01

## 2022-06-01 LAB
FOLATE: 16.1 NG/ML
VITAMIN B-12: 561 PG/ML (ref 232–1245)

## 2022-08-12 ENCOUNTER — HOSPITAL ENCOUNTER (OUTPATIENT)
Age: 72
Setting detail: SPECIMEN
Discharge: HOME | End: 2022-08-12

## 2022-08-12 LAB
ALT SERPL-CCNC: 36 U/L (ref 5–41)
AST SERPL-CCNC: 33 U/L
CHOLESTEROL, FASTING: 187 MG/DL
CHOLESTEROL/HDL RATIO: 3.4
ESTIMATED AVERAGE GLUCOSE: 111 MG/DL
HBA1C MFR BLD: 5.5 % (ref 4–6)
HDLC SERPL-MCNC: 55 MG/DL
LDL CHOLESTEROL: 109 MG/DL (ref 0–130)
TRIGLYCERIDE, FASTING: 115 MG/DL

## 2022-12-30 ENCOUNTER — HOSPITAL ENCOUNTER (OUTPATIENT)
Age: 72
Setting detail: SPECIMEN
Discharge: HOME | End: 2022-12-30

## 2022-12-30 LAB
ALBUMIN SERPL-MCNC: 4.4 G/DL (ref 3.5–5.2)
ALBUMIN/GLOBULIN RATIO: 1.5 (ref 1–2.5)
ALP BLD-CCNC: 47 U/L (ref 40–129)
ALT SERPL-CCNC: 59 U/L (ref 5–41)
ANION GAP SERPL CALCULATED.3IONS-SCNC: 12 MMOL/L (ref 9–17)
AST SERPL-CCNC: 48 U/L
BILIRUB SERPL-MCNC: 0.8 MG/DL (ref 0.3–1.2)
BUN BLDV-MCNC: 16 MG/DL (ref 8–23)
C-REACTIVE PROTEIN: <3 MG/L (ref 0–5)
CALCIUM SERPL-MCNC: 9.2 MG/DL (ref 8.6–10.4)
CHLORIDE BLD-SCNC: 102 MMOL/L (ref 98–107)
CHOLESTEROL, FASTING: 187 MG/DL
CHOLESTEROL/HDL RATIO: 3.5
CO2: 26 MMOL/L (ref 20–31)
CREAT SERPL-MCNC: 1.01 MG/DL (ref 0.7–1.2)
ESTIMATED AVERAGE GLUCOSE: 111 MG/DL
GFR SERPL CREATININE-BSD FRML MDRD: >60 ML/MIN/1.73M2
GLUCOSE BLD-MCNC: 112 MG/DL (ref 70–99)
HBA1C MFR BLD: 5.5 % (ref 4–6)
HCT VFR BLD CALC: 43.6 % (ref 40.7–50.3)
HDLC SERPL-MCNC: 53 MG/DL
HEMOGLOBIN: 14.3 G/DL (ref 13–17)
LDL CHOLESTEROL: 106 MG/DL (ref 0–130)
MCH RBC QN AUTO: 34.2 PG (ref 25.2–33.5)
MCHC RBC AUTO-ENTMCNC: 32.8 G/DL (ref 28.4–34.8)
MCV RBC AUTO: 104.3 FL (ref 82.6–102.9)
NRBC AUTOMATED: 0 PER 100 WBC
PDW BLD-RTO: 12 % (ref 11.8–14.4)
PLATELET # BLD: 60 K/UL (ref 138–453)
PMV BLD AUTO: 12.4 FL (ref 8.1–13.5)
POTASSIUM SERPL-SCNC: 4.8 MMOL/L (ref 3.7–5.3)
PROSTATE SPECIFIC ANTIGEN: 2.78 NG/ML
RBC # BLD: 4.18 M/UL (ref 4.21–5.77)
SODIUM BLD-SCNC: 140 MMOL/L (ref 135–144)
TOTAL PROTEIN: 7.3 G/DL (ref 6.4–8.3)
TRIGLYCERIDE, FASTING: 138 MG/DL
TSH SERPL DL<=0.05 MIU/L-ACNC: 3.92 UIU/ML (ref 0.3–5)
WBC # BLD: 6.6 K/UL (ref 3.5–11.3)

## 2023-03-13 ENCOUNTER — NURSE ONLY (OUTPATIENT)
Dept: PRIMARY CARE CLINIC | Age: 73
End: 2023-03-13

## 2023-03-13 ENCOUNTER — HOSPITAL ENCOUNTER (EMERGENCY)
Facility: CLINIC | Age: 73
Discharge: HOME | End: 2023-03-13
Attending: EMERGENCY MEDICINE
Payer: MEDICARE

## 2023-03-13 VITALS
WEIGHT: 215 LBS | DIASTOLIC BLOOD PRESSURE: 101 MMHG | RESPIRATION RATE: 16 BRPM | TEMPERATURE: 97.9 F | HEART RATE: 71 BPM | SYSTOLIC BLOOD PRESSURE: 163 MMHG | OXYGEN SATURATION: 97 % | HEIGHT: 71 IN | BODY MASS INDEX: 30.1 KG/M2

## 2023-03-13 DIAGNOSIS — I10 PRIMARY HYPERTENSION: Primary | ICD-10-CM

## 2023-03-13 DIAGNOSIS — I16.0 HYPERTENSIVE URGENCY: Primary | ICD-10-CM

## 2023-03-13 PROCEDURE — 99999 PR OFFICE/OUTPT VISIT,PROCEDURE ONLY: CPT | Performed by: NURSE PRACTITIONER

## 2023-03-13 PROCEDURE — 96372 THER/PROPH/DIAG INJ SC/IM: CPT

## 2023-03-13 PROCEDURE — 99284 EMERGENCY DEPT VISIT MOD MDM: CPT

## 2023-03-13 PROCEDURE — 6360000002 HC RX W HCPCS: Performed by: EMERGENCY MEDICINE

## 2023-03-13 RX ORDER — PROPAFENONE HYDROCHLORIDE 225 MG/1
TABLET, FILM COATED ORAL
COMMUNITY
Start: 2023-02-18

## 2023-03-13 RX ORDER — HYDRALAZINE HYDROCHLORIDE 20 MG/ML
10 INJECTION INTRAMUSCULAR; INTRAVENOUS ONCE
Status: COMPLETED | OUTPATIENT
Start: 2023-03-13 | End: 2023-03-13

## 2023-03-13 RX ADMIN — HYDRALAZINE HYDROCHLORIDE 10 MG: 20 INJECTION INTRAMUSCULAR; INTRAVENOUS at 17:17

## 2023-03-13 ASSESSMENT — ENCOUNTER SYMPTOMS
SHORTNESS OF BREATH: 0
SORE THROAT: 0
DIARRHEA: 0
VOMITING: 0

## 2023-03-13 ASSESSMENT — PAIN - FUNCTIONAL ASSESSMENT: PAIN_FUNCTIONAL_ASSESSMENT: NONE - DENIES PAIN

## 2023-03-13 NOTE — DISCHARGE INSTRUCTIONS
Keep a blood pressure log as we discussed    At this time the patient is without objective evidence of an acute process requiring hospitalization or inpatient management. They have remained hemodynamically stable and are stable for discharge with outpatient follow-up. Standard anticipatory guidance given to patient upon discharge. Have given them a specific time frame in which to follow-up and who to follow-up with. I have also advised them that they should return to the emergency department if they get worse, or not getting better or develop any new or concerning symptoms. Patient demonstrates understanding.

## 2023-03-13 NOTE — PROGRESS NOTES
Patient presents for elevated blood pressure. States that it has been high at home and he has had a headache that won't go away. He states that he tried taking motrin without success. He is currently on rythmol and xarelto for a fib, which he seems a cardiologist for. He has history of a cardiac ablation. States that he had a stress test recently which came back negative. BP in the office 172/103. Due to the elevated BP, headache, and his cardiac hx, I have referred the patient to the ED for further evaluation. He declined ambulance transport.     Electronically signed by DEYVI Huff CNP on 3/13/2023 at 3:16 PM

## 2023-03-13 NOTE — ED PROVIDER NOTES
[facility] ED  [facility]  Phone: [phone] [address] [place] [address] ED  EMERGENCY DEPARTMENT ENCOUNTER      Pt Name: Luis Hagan  MRN: [id number]  Jose Manuelgfyoan 1950  Date of evaluation: 3/13/2023  Provider: Tennille Hernandez DO    CHIEF COMPLAINT       Chief Complaint   Patient presents with    Hypertension         HISTORY OF PRESENT ILLNESS   (Location/Symptom, Timing/Onset,Context/Setting, Quality, Duration, Modifying Factors, Severity)  Note limiting factors. Luis Hagan is a 81 y.o. male who presents to the emergency department for the evaluation of high blood pressure. Patient states he woke up today and his blood pressure was elevated. He checked it once and it was high and then he checked it a little bit later and it was higher. He went to his primary care physician and they referred him to the ER. He has no chest pain or shortness of breath. He has mild intermittent headaches. He has no change in vision, no difficulty speaking and no numbness, tingling or weakness in the arms or legs. Patient states he took propafenone that was prescribed to him. He normally does not have any issues with blood pressure. Nursing Notes were reviewed. REVIEW OF SYSTEMS    (2-9systems for level 4, 10 or more for level 5)     Review of Systems   Constitutional:  Negative for fever. HENT:  Negative for sore throat. Respiratory:  Negative for shortness of breath. Cardiovascular:  Negative for chest pain. Gastrointestinal:  Negative for diarrhea and vomiting. Genitourinary:  Negative for dysuria. Skin:  Negative for rash. Neurological:  Negative for weakness. All other systems reviewed and are negative. Except asnoted above the remainder of the review of systems was reviewed and negative.        PAST MEDICAL HISTORY     Past Medical History:   Diagnosis Date    Arthritis     Atrial fibrillation (HCC)     GERD (gastroesophageal reflux disease) acid reflex    Hyperlipidemia          SURGICAL HISTORY       Past Surgical History:   Procedure Laterality Date    APPENDECTOMY      HERNIA REPAIR      NASAL SEPTUM SURGERY      NASAL SEPTUM SURGERY      when 23 yrs old    TONSILLECTOMY           CURRENT MEDICATIONS     Previous Medications    ALPRAZOLAM (XANAX) 0.25 MG TABLET    Take 0.25 mg by mouth 2 times daily as needed for Sleep or Anxiety. ASPIRIN 81 MG TABLET    Take 81 mg by mouth daily     ATORVASTATIN (LIPITOR) 20 MG TABLET    Take 20 mg by mouth daily     CALCIUM-MAGNESIUM-VITAMIN D ER (CALCIUM 1200+D3) 600- MG-MG-UNIT TB24    Take 1 tablet by mouth daily    FLUTICASONE (FLONASE) 50 MCG/ACT NASAL SPRAY    1 spray by Each Nostril route daily as needed for Allergies    MELATONIN 10 MG TABS    Take 10 mg by mouth nightly    MULTIPLE VITAMINS-MINERALS (THERAPEUTIC MULTIVITAMIN-MINERALS) TABLET    Take 1 tablet by mouth daily    OMEPRAZOLE 20 MG EC TABLET    Take 20 mg by mouth daily     PROPAFENONE (RYTHMOL) 225 MG TABLET    TAKE 1 TABLET BY MOUTH TWICE DAILY    RIVAROXABAN 15 & 20 MG STARTER PACK    Take as directed on package. TURMERIC (QC TUMERIC COMPLEX) 500 MG CAPS    Take 1 tablet by mouth daily       ALLERGIES     Seasonal    FAMILY HISTORY       Family History   Problem Relation Age of Onset    Coronary Art Dis Mother     Coronary Art Dis Father     Heart Attack Father     Diabetes Sister     Cancer Sister           SOCIAL HISTORY       Social History     Socioeconomic History    Marital status:    Tobacco Use    Smoking status: Passive Smoke Exposure - Never Smoker    Smokeless tobacco: Never   Substance and Sexual Activity    Alcohol use:  Yes     Alcohol/week: 30.0 standard drinks     Types: 30 Cans of beer per week     Comment: 30 beers per week, occ scotch or tequila    Drug use: No       SCREENINGS             PHYSICAL EXAM    (up to 7 for level 4, 8 or more for level 5)     ED Triage Vitals [03/13/23 1622]   BP Temp Temp Source Heart Rate Resp SpO2 Height Weight   (!) 189/96 97.9 °F (36.6 °C) Oral 80 16 97 % 5' 11\" (1.803 m) 215 lb (97.5 kg)       Physical Exam  Vitals and nursing note reviewed. Constitutional:       General: He is not in acute distress. Appearance: Normal appearance. He is not ill-appearing or toxic-appearing. HENT:      Head: Normocephalic and atraumatic. Nose: Nose normal. No congestion. Mouth/Throat:      Mouth: Mucous membranes are moist.   Eyes:      General:         Right eye: No discharge. Left eye: No discharge. Conjunctiva/sclera: Conjunctivae normal.   Cardiovascular:      Rate and Rhythm: Normal rate and regular rhythm. Pulses: Normal pulses. Heart sounds: Normal heart sounds. No murmur heard. Pulmonary:      Effort: Pulmonary effort is normal. No respiratory distress. Breath sounds: Normal breath sounds. No wheezing. Abdominal:      General: Abdomen is flat. There is no distension. Palpations: Abdomen is soft. There is no pulsatile mass. Tenderness: There is no abdominal tenderness. There is no guarding or rebound. Comments: No pulsatile mass   Musculoskeletal:         General: No deformity or signs of injury. Normal range of motion. Cervical back: Normal range of motion. Skin:     General: Skin is warm and dry. Capillary Refill: Capillary refill takes less than 2 seconds. Findings: No rash. Neurological:      General: No focal deficit present. Mental Status: He is alert and oriented to person, place, and time. Mental status is at baseline. Sensory: No sensory deficit. Motor: No weakness. Comments: Speaking normally. No facial asymmetry. Moving all 4 extremities. Normal gait. No visual field deficits. Extraocular movements intact. There is no horizontal or vertical nystagmus. Pt can raise eyebrows, close eyes tight and puff out cheeks. Hearing intact. Uvula midline and no difficulty swallowing. Can rotate head side to side and shrug shoulders. The patient has a normal finger to nose exam performed at bedside.   Psychiatric:         Mood and Affect: Mood normal.       EMERGENCY DEPARTMENT COURSE and DIFFERENTIAL DIAGNOSIS/MDM:   Vitals:    Vitals:    03/13/23 1622   BP: (!) 189/96   Pulse: 80   Resp: 16   Temp: 97.9 °F (36.6 °C)   TempSrc: Oral   SpO2: 97%   Weight: 97.5 kg (215 lb)   Height: 5' 11\" (1.803 m)       Patient presents to the emergency department with the complaint described above.  Vital signs show hypertension, otherwise unremarkable.  Physical examination reveals no neurologic or cardiac deficits.  Review of the medical record shows he has a recently negative stress test, he had blood work done 2.5 months ago showing normal renal function and electrolytes.  At this time he has asymptomatic hypertension.  I am giving some hydralazine as he is very concerned about the blood pressure being elevated by I spent a long time talking to him about elevated blood pressure, long-term control, keeping a log and rapid follow-up with PCP to further discuss.  He may need to be on a different medication that helps address the blood pressure.  We talked about red flags and things to return to the ER for    At this time the patient is without objective evidence of an acute process requiring hospitalization or inpatient management. They have remained hemodynamically stable and are stable for discharge with outpatient follow-up.     Standard anticipatory guidance given to patient upon discharge.  Have given them a specific time frame in which to follow-up and who to follow-up with.  I have also advised them that they should return to the emergency department if they get worse, or not getting better or develop any new or concerning symptoms.  Patient demonstrates understanding.      PROCEDURES:  Unless otherwise noted below, none     Procedures    FINAL IMPRESSION      1. Primary hypertension       DISPOSITION/PLAN   DISPOSITION Discharge - Pending Orders Complete 03/13/2023 04:43:25 PM      PATIENT REFERRED TO:  Derian Menezes MD  [address] [place] [address]  [phone]    In 3 days      DISCHARGE MEDICATIONS:  New Prescriptions    No medications on file          (Please note that portions of this note were completed with a voice recognition program.  Efforts were made to edit the dictations but occasionally words are mis-transcribed.)    Nitin Jacob DO,(electronically signed)  Board Certified Emergency Physician         Nitin Jacob DO  03/13/23 [id number]

## 2024-03-13 ENCOUNTER — HOSPITAL ENCOUNTER (OUTPATIENT)
Age: 74
Setting detail: SPECIMEN
Discharge: HOME | End: 2024-03-13

## 2024-03-13 LAB
CK SERPL-CCNC: 123 U/L (ref 39–308)
MYOGLOBIN SERPL-MCNC: 69 NG/ML (ref 28–72)

## 2024-04-19 ENCOUNTER — TELEPHONE (OUTPATIENT)
Age: 74
End: 2024-04-19

## 2024-04-19 NOTE — TELEPHONE ENCOUNTER
4/19/2024 - patient scheduled with Dr. Leonard on 5/14/2024. Left voicemail to see if patient wants to move appointment to 5/10/2024.

## 2024-05-10 ENCOUNTER — OFFICE VISIT (OUTPATIENT)
Age: 74
End: 2024-05-10
Payer: MEDICARE

## 2024-05-10 VITALS
WEIGHT: 220 LBS | HEART RATE: 73 BPM | BODY MASS INDEX: 31.5 KG/M2 | DIASTOLIC BLOOD PRESSURE: 96 MMHG | HEIGHT: 70 IN | SYSTOLIC BLOOD PRESSURE: 159 MMHG

## 2024-05-10 DIAGNOSIS — M21.372 BILATERAL FOOT-DROP: Primary | ICD-10-CM

## 2024-05-10 DIAGNOSIS — M79.604 RIGHT LEG PAIN: ICD-10-CM

## 2024-05-10 DIAGNOSIS — M21.371 BILATERAL FOOT-DROP: Primary | ICD-10-CM

## 2024-05-10 PROCEDURE — G8427 DOCREV CUR MEDS BY ELIG CLIN: HCPCS | Performed by: NEUROLOGICAL SURGERY

## 2024-05-10 PROCEDURE — G8417 CALC BMI ABV UP PARAM F/U: HCPCS | Performed by: NEUROLOGICAL SURGERY

## 2024-05-10 PROCEDURE — 99203 OFFICE O/P NEW LOW 30 MIN: CPT | Performed by: NEUROLOGICAL SURGERY

## 2024-05-10 PROCEDURE — 1123F ACP DISCUSS/DSCN MKR DOCD: CPT | Performed by: NEUROLOGICAL SURGERY

## 2024-05-10 PROCEDURE — 3017F COLORECTAL CA SCREEN DOC REV: CPT | Performed by: NEUROLOGICAL SURGERY

## 2024-05-10 PROCEDURE — 1036F TOBACCO NON-USER: CPT | Performed by: NEUROLOGICAL SURGERY

## 2024-05-14 NOTE — PROGRESS NOTES
Review of Systems   HENT:  Positive for tinnitus.         Hearing loss    Eyes:         Floaters    Respiratory: Negative.     Cardiovascular:  Positive for palpitations.   Gastrointestinal: Negative.    Genitourinary: Negative.    Musculoskeletal:         Joint stiffness    Neurological:  Positive for dizziness, numbness and headaches.     
week, occ scotch or tequila    Drug use: No       Allergies   Allergen Reactions    Seasonal        Review of Systems:     I reviewed the review of systems as documented by clinical staff.      Physical Exam:      BP (!) 159/96   Pulse 73   Ht 1.778 m (5' 10\")   Wt 99.8 kg (220 lb)   BMI 31.57 kg/m²     He is awake, alert, and in no acute distress.  He answers questions appropriately with clear and fluent speech.  His cranial nerves are grossly intact.  The extraocular movements are intact and the face moves symmetrically.  He does not have pronator drift.  He is moving his extremities well with 5/5 strength, except for dorsiflexion and extensor houses longus, which are both about 4 -/5 bilaterally.  Tripp sign is absent bilaterally.  His reflexes are symmetric and normal.  He does well with finger-to-nose testing.  His gait is steady.      Studies Review:     I personally reviewed an MRI of the lumbar spine, as well as the radiologist's report.  This study was completed on April 1, 2024 at [facility].  There is a left foraminal disc herniation at L5-S1 that would be affecting the L5 root if anything.  There also is a broad-based disc bulge at L4-5 causing some moderate canal stenosis at this level.    Assessment and Plan:     1. Bilateral foot-drop    2. Right leg pain        Plan: Mr. Candelario is having difficulty with pain in the lower back that is radiating into the lower extremities, particularly the right side.  He does have some weakness with dorsiflexion and extensor houses longus, which is most pronounced in the right lower extremity.  While he does have some degenerative change in the spine, I think the findings at L4-5 would be the most likely potential explanation of his symptoms.  While he does have a lateral disc protrusion not at L5-S1 on the left, this would not explain any right-sided symptoms.  I think it would be reasonable for him to start with physical therapy, and I have provided an

## 2024-06-11 ENCOUNTER — OFFICE VISIT (OUTPATIENT)
Age: 74
End: 2024-06-11
Payer: MEDICARE

## 2024-06-11 VITALS
WEIGHT: 220 LBS | SYSTOLIC BLOOD PRESSURE: 161 MMHG | BODY MASS INDEX: 31.57 KG/M2 | HEART RATE: 64 BPM | DIASTOLIC BLOOD PRESSURE: 91 MMHG

## 2024-06-11 DIAGNOSIS — M21.372 BILATERAL FOOT-DROP: Primary | ICD-10-CM

## 2024-06-11 DIAGNOSIS — M21.371 BILATERAL FOOT-DROP: Primary | ICD-10-CM

## 2024-06-11 DIAGNOSIS — M79.604 RIGHT LEG PAIN: ICD-10-CM

## 2024-06-11 DIAGNOSIS — M47.816 LUMBAR SPONDYLOSIS: ICD-10-CM

## 2024-06-11 PROCEDURE — G8417 CALC BMI ABV UP PARAM F/U: HCPCS | Performed by: NEUROLOGICAL SURGERY

## 2024-06-11 PROCEDURE — 99213 OFFICE O/P EST LOW 20 MIN: CPT | Performed by: NEUROLOGICAL SURGERY

## 2024-06-11 PROCEDURE — G8427 DOCREV CUR MEDS BY ELIG CLIN: HCPCS | Performed by: NEUROLOGICAL SURGERY

## 2024-06-11 PROCEDURE — 1123F ACP DISCUSS/DSCN MKR DOCD: CPT | Performed by: NEUROLOGICAL SURGERY

## 2024-06-11 PROCEDURE — 1036F TOBACCO NON-USER: CPT | Performed by: NEUROLOGICAL SURGERY

## 2024-06-11 PROCEDURE — 3017F COLORECTAL CA SCREEN DOC REV: CPT | Performed by: NEUROLOGICAL SURGERY

## 2024-07-10 ENCOUNTER — HOSPITAL ENCOUNTER (OUTPATIENT)
Age: 74
Setting detail: SPECIMEN
Discharge: HOME | End: 2024-07-10

## 2024-07-10 LAB
ALBUMIN SERPL-MCNC: 4.5 G/DL (ref 3.5–5.2)
ALBUMIN/GLOB SERPL: 2 {RATIO} (ref 1–2.5)
ALP SERPL-CCNC: 50 U/L (ref 40–129)
ALT SERPL-CCNC: 84 U/L (ref 10–50)
ANION GAP SERPL CALCULATED.3IONS-SCNC: 10 MMOL/L (ref 9–16)
AST SERPL-CCNC: 65 U/L (ref 10–50)
BILIRUB SERPL-MCNC: 0.9 MG/DL (ref 0–1.2)
BUN SERPL-MCNC: 12 MG/DL (ref 8–23)
CALCIUM SERPL-MCNC: 9.4 MG/DL (ref 8.6–10.4)
CHLORIDE SERPL-SCNC: 105 MMOL/L (ref 98–107)
CO2 SERPL-SCNC: 23 MMOL/L (ref 20–31)
CREAT SERPL-MCNC: 1 MG/DL (ref 0.7–1.2)
ERYTHROCYTE [DISTWIDTH] IN BLOOD BY AUTOMATED COUNT: 12.4 % (ref 11.8–14.4)
GFR, ESTIMATED: 78 ML/MIN/1.73M2
GLUCOSE SERPL-MCNC: 104 MG/DL (ref 74–99)
HCT VFR BLD AUTO: 42.5 % (ref 40.7–50.3)
HGB BLD-MCNC: 14.6 G/DL (ref 13–17)
MCH RBC QN AUTO: 34.7 PG (ref 25.2–33.5)
MCHC RBC AUTO-ENTMCNC: 34.4 G/DL (ref 28.4–34.8)
MCV RBC AUTO: 101 FL (ref 82.6–102.9)
NRBC BLD-RTO: 0 PER 100 WBC
PLATELET # BLD AUTO: ABNORMAL K/UL (ref 138–453)
PLATELET, FLUORESCENCE: 136 K/UL (ref 138–453)
PLATELETS.RETICULATED NFR BLD AUTO: 13.8 % (ref 1.1–10.3)
POTASSIUM SERPL-SCNC: 4.9 MMOL/L (ref 3.7–5.3)
PROT SERPL-MCNC: 7.3 G/DL (ref 6.6–8.7)
RBC # BLD AUTO: 4.21 M/UL (ref 4.21–5.77)
SODIUM SERPL-SCNC: 138 MMOL/L (ref 136–145)
WBC OTHER # BLD: 5.3 K/UL (ref 3.5–11.3)

## 2024-07-26 ENCOUNTER — TELEPHONE (OUTPATIENT)
Dept: INTERVENTIONAL RADIOLOGY/VASCULAR | Age: 74
End: 2024-07-26

## 2024-08-09 ENCOUNTER — HOSPITAL ENCOUNTER (OUTPATIENT)
Dept: ULTRASOUND IMAGING | Age: 74
End: 2024-08-09
Payer: MEDICARE

## 2024-08-09 VITALS
OXYGEN SATURATION: 95 % | TEMPERATURE: 97.2 F | SYSTOLIC BLOOD PRESSURE: 139 MMHG | RESPIRATION RATE: 16 BRPM | HEART RATE: 70 BPM | WEIGHT: 219.3 LBS | BODY MASS INDEX: 30.7 KG/M2 | DIASTOLIC BLOOD PRESSURE: 83 MMHG | HEIGHT: 71 IN

## 2024-08-09 DIAGNOSIS — R94.5 ABNORMAL LIVER FUNCTION: ICD-10-CM

## 2024-08-09 LAB
INR PPP: 1
PARTIAL THROMBOPLASTIN TIME: 34.5 SEC (ref 23.9–33.8)
PLATELET # BLD AUTO: 177 K/UL (ref 138–453)
PROTHROMBIN TIME: 13.7 SEC (ref 11.5–14.2)

## 2024-08-09 PROCEDURE — 88313 SPECIAL STAINS GROUP 2: CPT

## 2024-08-09 PROCEDURE — 88307 TISSUE EXAM BY PATHOLOGIST: CPT

## 2024-08-09 PROCEDURE — 2580000003 HC RX 258: Performed by: RADIOLOGY

## 2024-08-09 PROCEDURE — 88342 IMHCHEM/IMCYTCHM 1ST ANTB: CPT

## 2024-08-09 PROCEDURE — 7100000010 HC PHASE II RECOVERY - FIRST 15 MIN: Performed by: RADIOLOGY

## 2024-08-09 PROCEDURE — 47000 NEEDLE BIOPSY OF LIVER PERQ: CPT

## 2024-08-09 PROCEDURE — 85730 THROMBOPLASTIN TIME PARTIAL: CPT

## 2024-08-09 PROCEDURE — 7100000011 HC PHASE II RECOVERY - ADDTL 15 MIN: Performed by: RADIOLOGY

## 2024-08-09 PROCEDURE — 85049 AUTOMATED PLATELET COUNT: CPT

## 2024-08-09 PROCEDURE — 85610 PROTHROMBIN TIME: CPT

## 2024-08-09 PROCEDURE — 6360000002 HC RX W HCPCS: Performed by: RADIOLOGY

## 2024-08-09 RX ORDER — SODIUM CHLORIDE 9 MG/ML
INJECTION, SOLUTION INTRAVENOUS CONTINUOUS
Status: DISCONTINUED | OUTPATIENT
Start: 2024-08-09 | End: 2024-08-12 | Stop reason: HOSPADM

## 2024-08-09 RX ORDER — SODIUM CHLORIDE 9 MG/ML
INJECTION, SOLUTION INTRAVENOUS PRN
Status: DISCONTINUED | OUTPATIENT
Start: 2024-08-09 | End: 2024-08-12 | Stop reason: HOSPADM

## 2024-08-09 RX ORDER — MIDAZOLAM HYDROCHLORIDE 1 MG/ML
INJECTION INTRAMUSCULAR; INTRAVENOUS PRN
Status: COMPLETED | OUTPATIENT
Start: 2024-08-09 | End: 2024-08-09

## 2024-08-09 RX ORDER — SODIUM CHLORIDE 0.9 % (FLUSH) 0.9 %
5-40 SYRINGE (ML) INJECTION EVERY 12 HOURS SCHEDULED
Status: DISCONTINUED | OUTPATIENT
Start: 2024-08-09 | End: 2024-08-12 | Stop reason: HOSPADM

## 2024-08-09 RX ORDER — PANTOPRAZOLE SODIUM 40 MG/1
40 TABLET, DELAYED RELEASE ORAL DAILY
COMMUNITY
Start: 2024-06-17

## 2024-08-09 RX ORDER — SODIUM CHLORIDE 0.9 % (FLUSH) 0.9 %
5-40 SYRINGE (ML) INJECTION PRN
Status: DISCONTINUED | OUTPATIENT
Start: 2024-08-09 | End: 2024-08-12 | Stop reason: HOSPADM

## 2024-08-09 RX ORDER — BACLOFEN 10 MG/1
10 TABLET ORAL NIGHTLY PRN
COMMUNITY
Start: 2024-07-13

## 2024-08-09 RX ORDER — DABIGATRAN ETEXILATE 150 MG/1
150 CAPSULE ORAL 2 TIMES DAILY
COMMUNITY
Start: 2024-07-16

## 2024-08-09 RX ADMIN — MIDAZOLAM 0.5 MG: 1 INJECTION INTRAMUSCULAR; INTRAVENOUS at 13:36

## 2024-08-09 RX ADMIN — SODIUM CHLORIDE: 9 INJECTION, SOLUTION INTRAVENOUS at 12:23

## 2024-08-09 ASSESSMENT — PAIN SCALES - GENERAL
PAINLEVEL_OUTOF10: 0

## 2024-08-09 ASSESSMENT — PAIN - FUNCTIONAL ASSESSMENT: PAIN_FUNCTIONAL_ASSESSMENT: 0-10

## 2024-08-09 NOTE — POST SEDATION
Sedation Post Procedure Note    Patient Name: Hilton Candelario   YOB: 1950  Room/Bed: Room/bed info not found  Medical Record Number: [id number]  Date: 8/9/2024   Time: 1:49 PM         Physicians/Assistants: Brenda Mancia MD, MD    Procedure Performed:  US guided random core liver biopsy    Post-Sedation Vital Signs:  Vitals:    08/09/24 1345   BP: 136/89   Pulse: 74   Resp: 16   Temp:    SpO2: 97%      Vital signs were reviewed and were stable after the procedure (see flow sheet for vitals)            Complications: none    Electronically signed by Brenda Mancia MD on 8/9/2024 at 1:49 PM

## 2024-08-09 NOTE — H&P
Interval H&P Note    Pt Name: Hilton Candelario  MRN: [id number]  YOB: 1950  Date of evaluation: 8/9/2024      [x] I have reviewed the [facility] PRACTICE PROGRESS NOTE OF 7/29/2024 BY  by FELIPE SCHMIDT AVAILABLE BY HARD COPY IN THE SHORT CHART WHICH MEETS CRITERIA FOR  an Interval History and Physical note. SEE THE SHORT CHART      [x] I have examined  Hilton Candelario, a 74 y.o. male.There are no changes to the patient who is scheduled for A LIVER BIOPSY by DR. WISE IN INTERVENTIONAL RADIOLOGY  for Thrombocytopenia, unspecified; Abnormal levels of other serum enzymes.       The patient denies new health changes, fever, chills, wheezing, cough, increased SOB, chest pain, open sores or wounds HE TAKES PRADAXA DUE TO POST ABLATION ATRIAL FIBRILLATION.HE STOPPED PRADAXA 8/6/2024 HE DOES NOT HAVE DIABETES.    Vital signs: BP (!) 136/90   Pulse 78   Temp 97.2 °F (36.2 °C)   Resp 16   Ht 1.803 m (5' 11\")   Wt 99.5 kg (219 lb 4.8 oz)   SpO2 95%   BMI 30.59 kg/m²     Allergies:  Seasonal    Medications:    Prior to Admission medications    Medication Sig Start Date End Date Taking? Authorizing Provider   Apixaban (ELIQUIS PO) Take by mouth   Yes Alan Ty MD   propafenone (RYTHMOL) 225 MG tablet TAKE 1 TABLET BY MOUTH TWICE DAILY 2/18/23   Alan Ty MD   rivaroxaban 15 & 20 MG Starter Pack Take as directed on package. 10/23/20   Gilbert Butt MD   fluticasone (FLONASE) 50 MCG/ACT nasal spray 1 spray by Each Nostril route daily as needed for Allergies    Alan Ty MD   ALPRAZolam (XANAX) 0.25 MG tablet Take 1 tablet by mouth 2 times daily as needed for Sleep or Anxiety.    Alan Ty MD   Turmeric (QC TUMERIC COMPLEX) 500 MG CAPS Take 1 tablet by mouth daily    Alan Ty MD   Multiple Vitamins-Minerals (THERAPEUTIC MULTIVITAMIN-MINERALS) tablet Take 1 tablet by mouth daily    Alan Ty MD   Melatonin 10 MG TABS Take 10 mg by mouth

## 2024-08-09 NOTE — PRE SEDATION
Sedation Pre-Procedure Note    Patient Name: Hilton Candelario   YOB: 1950  Room/Bed: Room/bed info not found  Medical Record Number: [id number]  Date: 8/9/2024   Time: 1:25 PM       Indication:  Abnormal serum enzymes    Consent: I have discussed with the patient and/or the patient representative the indication, alternatives, and the possible risks and/or complications of the planned procedure and the anesthesia methods. The patient and/or patient representative appear to understand and agree to proceed.    Vital Signs:   Vitals:    08/09/24 1146   BP: (!) 136/90   Pulse: 78   Resp: 16   Temp: 97.2 °F (36.2 °C)   SpO2: 95%       Past Medical History:   has a past medical history of Arthritis, Atrial fibrillation (HCC), GERD (gastroesophageal reflux disease), and Hyperlipidemia.    Past Surgical History:   has a past surgical history that includes Appendectomy; Tonsillectomy; hernia repair; Nasal septum surgery; Nasal septum surgery; Cardiac electrophysiology study and ablation (2022); Colonoscopy; Endoscopy, colon, diagnostic; Insertable Cardiac Monitor (Left); and skin biopsy.    Medications:   Scheduled Meds:    sodium chloride flush  5-40 mL IntraVENous 2 times per day     Continuous Infusions:    sodium chloride 20 mL/hr at 08/09/24 1223    sodium chloride       PRN Meds: sodium chloride flush, sodium chloride  Home Meds:   Prior to Admission medications    Medication Sig Start Date End Date Taking? Authorizing Provider   Apixaban (ELIQUIS PO) Take by mouth  Patient not taking: Reported on 8/9/2024   Yes ProviderAlan MD   baclofen (LIORESAL) 10 MG tablet Take 1 tablet by mouth nightly as needed 7/13/24  Yes ProviderAlan MD   pantoprazole (PROTONIX) 40 MG tablet Take 1 tablet by mouth daily 6/17/24  Yes ProviderAlan MD   dabigatran (PRADAXA) 150 MG capsule Take 1 capsule by mouth 2 times daily 7/16/24  Yes ProviderAlan MD   propafenone (RYTHMOL) 225 MG tablet TAKE 1

## 2024-08-09 NOTE — BRIEF OP NOTE
Brief Postoperative Note    Hilton Candelario  YOB: 1950  [id number]    Pre-operative Diagnosis: Abnormal serum enzymes    Post-operative Diagnosis: Same    Procedure: US guided random core liver biopsy    Anesthesia: Moderate Sedation    Surgeons/Assistants: Gavino    Estimated Blood Loss: less than 50     Complications: None    Specimens: Was Obtained: 4 core samples using 20 G needle    Electronically signed by Brenda Mancia MD on 8/9/2024 at 1:50 PM

## 2024-08-13 ENCOUNTER — HOSPITAL ENCOUNTER (OUTPATIENT)
Age: 74
Setting detail: SPECIMEN
Discharge: HOME | End: 2024-08-13

## 2024-08-13 LAB
CHOLEST SERPL-MCNC: 164 MG/DL (ref 0–199)
CHOLESTEROL/HDL RATIO: 4
EST. AVERAGE GLUCOSE BLD GHB EST-MCNC: 114 MG/DL
HBA1C MFR BLD: 5.6 % (ref 4–6)
HDLC SERPL-MCNC: 38 MG/DL
LDLC SERPL CALC-MCNC: 99 MG/DL (ref 0–100)
PSA SERPL-MCNC: 3.2 NG/ML (ref 0–4)
TRIGL SERPL-MCNC: 137 MG/DL (ref 0–149)
VLDLC SERPL CALC-MCNC: 27 MG/DL

## 2024-08-14 LAB — SURGICAL PATHOLOGY REPORT: NORMAL

## 2024-08-20 ENCOUNTER — OFFICE VISIT (OUTPATIENT)
Age: 74
End: 2024-08-20
Payer: MEDICARE

## 2024-08-20 VITALS
DIASTOLIC BLOOD PRESSURE: 80 MMHG | BODY MASS INDEX: 30.66 KG/M2 | RESPIRATION RATE: 16 BRPM | HEART RATE: 74 BPM | WEIGHT: 219 LBS | SYSTOLIC BLOOD PRESSURE: 136 MMHG | HEIGHT: 71 IN

## 2024-08-20 DIAGNOSIS — M21.371 BILATERAL FOOT-DROP: Primary | ICD-10-CM

## 2024-08-20 DIAGNOSIS — R26.81 UNSTEADY GAIT: ICD-10-CM

## 2024-08-20 DIAGNOSIS — M21.372 BILATERAL FOOT-DROP: Primary | ICD-10-CM

## 2024-08-20 DIAGNOSIS — R29.898 HAND WEAKNESS: ICD-10-CM

## 2024-08-20 DIAGNOSIS — M47.816 LUMBAR SPONDYLOSIS: ICD-10-CM

## 2024-08-20 PROCEDURE — G8417 CALC BMI ABV UP PARAM F/U: HCPCS | Performed by: NEUROLOGICAL SURGERY

## 2024-08-20 PROCEDURE — 1036F TOBACCO NON-USER: CPT | Performed by: NEUROLOGICAL SURGERY

## 2024-08-20 PROCEDURE — 3017F COLORECTAL CA SCREEN DOC REV: CPT | Performed by: NEUROLOGICAL SURGERY

## 2024-08-20 PROCEDURE — 99214 OFFICE O/P EST MOD 30 MIN: CPT | Performed by: NEUROLOGICAL SURGERY

## 2024-08-20 PROCEDURE — G8427 DOCREV CUR MEDS BY ELIG CLIN: HCPCS | Performed by: NEUROLOGICAL SURGERY

## 2024-08-20 PROCEDURE — 1123F ACP DISCUSS/DSCN MKR DOCD: CPT | Performed by: NEUROLOGICAL SURGERY

## 2024-08-20 RX ORDER — DABIGATRAN ETEXILATE 150 MG/1
150 CAPSULE ORAL 2 TIMES DAILY
Qty: 60 CAPSULE | Refills: 0
Start: 2024-08-20

## 2024-08-23 NOTE — PROGRESS NOTES
[facility], [facility] NEUROSCIENCE [place], [facility] NEUROSURGERY  [address], [address]  [place] [id number]  Dept: [phone]  Dept Fax: [phone]     Patient:  Hilton Candelario  YOB: 1950  Date: 8/20/24      Chief Complaint   Patient presents with    Follow-up     Bilateral foot drop with PT follow up           HPI:     Mr. Candelario returns to the office for follow-up after undergoing physical therapy to help with lower back and leg pain as well as right dorsiflexion weakness.  He was last seen in early June and felt that his strength and pain had been improving.  He is now noticing worsening difficulty with fatigue in his legs.  He also feels unsteady at times, especially if walking on a side slope.        Physical Exam:      /80 (Site: Left Upper Arm, Position: Sitting, Cuff Size: Large Adult)   Pulse 74   Resp 16   Ht 1.803 m (5' 11\")   Wt 99.3 kg (219 lb)   BMI 30.54 kg/m²     He is awake, alert, and in no acute distress.  He answers questions appropriately with clear and fluent speech.  He has mild biceps and hip flexion weakness, at 4+/5 bilaterally.  His gait appears steady in the office.    Assessment and Plan:     1. Bilateral foot-drop    2. Lumbar spondylosis    3. Unsteady gait    4. Hand weakness        Mr. Candelario is now describing worsening fatigue in the legs and also is demonstrating some mild biceps and hip flexion weakness.  He is also noticing unsteadiness on his feet.  This combination of symptoms and findings is a bit concerning for possible myelopathy.  I have ordered an MRI of the cervical spine to further evaluate.  I will have him back to the office to review the results when this is completed.      Electronically signed by Michi Leonard MD on 8/23/2024 at 5:04 PM    Please note that this chart was generated using voice recognition Dragon dictation software.  Although every effort was made to ensure the

## 2024-08-24 NOTE — CARE COORDINATION
[facility]     Department of Internal Medicine - Staff Internal Medicine Teaching Service    INPATIENT DISCHARGE SUMMARY      Patient Identification:  Claudio Graham is a 73 y.o. male.  :  1951  MRN: [id number]     Acct: [id number]   PCP: Vanessa Mckenna APRN - CNP  Admit Date:  2024  Discharge date and time: 2024  6:17 PM   Attending Provider: No att. providers found                                     ACTIVE DISCHARGE DIAGNOSES     Hospital Problem Lists:  Principal Problem:    SKYLER (acute kidney injury) (HCC)  Active Problems:    VT (ventricular tachycardia) (HCC)    Hypotension due to hypovolemia    History of chronic CHF    History of CAD (coronary artery disease)    Dyslipidemia    Hypertension, essential    Paroxysmal A-fib (HCC)    Stage 3a chronic kidney disease (HCC)    History of type 2 diabetes mellitus    Dilated cardiomyopathy (HCC)    Chronic systolic congestive heart failure (HCC)  Resolved Problems:    * No resolved hospital problems. *      HOSPITAL STAY     Brief Inpatient course:   Claudio Graham is a 73 y.o. male  who was admitted for the management of SKYLER (acute kidney injury) (HCC), presented to the emergency department with SKYLER.  The patient was recently discharged with AICD placement for CHF in July and he was following up with CHF clinic with follow-up labs in which she was found to have SKYLER and hypokalemia.  He came to ED and he was completely asymptomatic but on the day of admission in the night patient became hypotensive and he was shifted to ICU for pressor support.  In the ED he was on pressor support and eventually pressor support was weaned off and he was maintaining his blood pressure without pressor support.  Patient was medically stable on discharge.      Procedures/ Significant Interventions:    None    Consults:     Consults:     Final Specialist Recommendations/Findings:   IP CONSULT TO INTERNAL MEDICINE  IP CONSULT TO  Date/Time:  10/24/2020 2:42 PM  Attempted to reach patient by telephone. Call within 2 business days of discharge: Yes Left HIPPA compliant message requesting a return call. Will attempt to reach patient again. NEPHROLOGY  IP CONSULT TO CASE MANAGEMENT  IP CONSULT TO CARDIOLOGY      Any Hospital Acquired Infections: none    Discharge Functional Status:  stable    DISCHARGE PLAN     Disposition: home    Patient Instructions:   Discharge Medication List as of 8/22/2024  5:12 PM        START taking these medications    Details   midodrine (PROAMATINE) 10 MG tablet Take 0.5 tablets by mouth 2 times daily as needed (Do not take after 6 PM or 4 hours before bedtime, if SBP > 120), Disp-90 tablet, R-3Normal           CONTINUE these medications which have CHANGED    Details   allopurinol (ZYLOPRIM) 300 MG tablet Take 0.5 tablets by mouth daily, Disp-30 tablet, R-3Normal           CONTINUE these medications which have NOT CHANGED    Details   levothyroxine (SYNTHROID) 88 MCG tablet Take 1 tablet by mouth daily, Disp-90 tablet, R-1Normal      furosemide (LASIX) 40 MG tablet Take 1 tablet by mouth in the morning and 1 tablet in the evening. Take 1 tablet in the morning, and half tablet in the evening., Disp-60 tablet, R-3Adjust Sig      amiodarone (CORDARONE) 200 MG tablet Take 1 tablet by mouth daily, Disp-30 tablet, R-3Normal      apixaban (ELIQUIS) 5 MG TABS tablet Take 1 tablet by mouth 2 times daily, Disp-60 tablet, R-4Normal      aspirin 81 MG chewable tablet Take 1 tablet by mouth daily, Disp-14 tablet, R-0Normal      atorvastatin (LIPITOR) 80 MG tablet Take 1 tablet by mouth daily, Disp-30 tablet, R-3Normal      ticagrelor (BRILINTA) 90 MG TABS tablet Take 1 tablet by mouth 2 times daily, Disp-60 tablet, R-5Normal      metFORMIN (GLUCOPHAGE) 1000 MG tablet take 1 tablet by mouth twice a day with meals, Disp-180 tablet, R-0Normal      Lancets (ONETOUCH DELICA PLUS [id number]) MISC use 1 LANCET to TEST BLOOD SUGAR daily, Disp-100 each, R-3Normal      isosorbide mononitrate (IMDUR) 30 MG extended release tablet take 1 tablet by mouth once daily, Disp-30 tablet, R-5Normal      FEROSUL 325 (65 Fe) MG tablet take 1 tablet by mouth  low <90 (the upper part of the blood pressure measurement), do not take it after 6 PM or 4 hours before bedtime or if upper part of the blood pressure measurement is more than 120    Please follow-up with your PCP within 5 to 7 days of discharge for continued care  Please call cardiology and nephrology and make an appointment within 1 to 2 weeks of discharge  Please follow-up CHF clinic for your heart failure management    If you begin to experience chest pain, shortness of breath, dizziness, loss of consciousness, palpitations or worsening of current symptoms feel free to come back to ER for further evaluation      Note that over 30 minutes was spent in preparing discharge papers, discussing discharge with patient, medication review, etc.      Meri eTllez MD, MD  Internal Medicine Resident, PGY-1  [facility]; [place], [place]  8/24/2024, 2:09 PM

## 2024-08-30 ENCOUNTER — HOSPITAL ENCOUNTER (OUTPATIENT)
Dept: MRI IMAGING | Age: 74
End: 2024-08-30
Attending: NEUROLOGICAL SURGERY
Payer: MEDICARE

## 2024-08-30 DIAGNOSIS — R26.81 UNSTEADY GAIT: ICD-10-CM

## 2024-08-30 DIAGNOSIS — R29.898 HAND WEAKNESS: ICD-10-CM

## 2024-08-30 PROCEDURE — 72141 MRI NECK SPINE W/O DYE: CPT

## 2024-09-05 ENCOUNTER — OFFICE VISIT (OUTPATIENT)
Age: 74
End: 2024-09-05
Payer: MEDICARE

## 2024-09-05 VITALS
WEIGHT: 214 LBS | RESPIRATION RATE: 15 BRPM | DIASTOLIC BLOOD PRESSURE: 82 MMHG | HEIGHT: 71 IN | BODY MASS INDEX: 29.96 KG/M2 | SYSTOLIC BLOOD PRESSURE: 137 MMHG | HEART RATE: 74 BPM

## 2024-09-05 DIAGNOSIS — M21.372 BILATERAL FOOT-DROP: Primary | ICD-10-CM

## 2024-09-05 DIAGNOSIS — M47.816 LUMBAR SPONDYLOSIS: ICD-10-CM

## 2024-09-05 DIAGNOSIS — M21.371 BILATERAL FOOT-DROP: Primary | ICD-10-CM

## 2024-09-05 PROCEDURE — 1123F ACP DISCUSS/DSCN MKR DOCD: CPT | Performed by: NEUROLOGICAL SURGERY

## 2024-09-05 PROCEDURE — G8427 DOCREV CUR MEDS BY ELIG CLIN: HCPCS | Performed by: NEUROLOGICAL SURGERY

## 2024-09-05 PROCEDURE — 3017F COLORECTAL CA SCREEN DOC REV: CPT | Performed by: NEUROLOGICAL SURGERY

## 2024-09-05 PROCEDURE — 1036F TOBACCO NON-USER: CPT | Performed by: NEUROLOGICAL SURGERY

## 2024-09-05 PROCEDURE — G8417 CALC BMI ABV UP PARAM F/U: HCPCS | Performed by: NEUROLOGICAL SURGERY

## 2024-09-05 PROCEDURE — 99214 OFFICE O/P EST MOD 30 MIN: CPT | Performed by: NEUROLOGICAL SURGERY

## 2024-09-05 NOTE — PROGRESS NOTES
worse on the left side.  We again reviewed the lumbar MRI, as well.  There are diffuse degenerative changes including lateral recess stenosis at multiple levels.      EMG:  We also reviewed an EMG report from March of this year.  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  There was also felt to be evidence of chronic L4/5 radiculopathy.    Assessment and Plan:     1. Bilateral foot-drop    2. Lumbar spondylosis        Mr. Candelario continues to have difficulty with foot drop bilaterally, as well as easy fatigability and a feeling of unsteadiness on his feet.  Given the fairly symmetric dorsiflexion weakness along with the EMG finding consistent with neuropathy, I suspect an underlying neuropathy is the most likely explanation for these symptoms.  It would be unlikely that he would have bilateral foot drop due to radiculopathy simultaneously.  I suggested evaluation by a neurologist, and he requested to see a neurologist in the [facility], so I have provided a referral to the practice of [facility].  We have not planned any specific follow-up in my office at this time, but he is welcome to contact me with any questions or concerns.      Electronically signed by Michi Leonard MD on 9/5/2024 at 4:39 PM    Please note that this chart was generated using voice recognition Dragon dictation software.  Although every effort was made to ensure the accuracy of this automated transcription, some errors in transcription may have occurred.

## 2024-10-01 ENCOUNTER — HOSPITAL ENCOUNTER (OUTPATIENT)
Age: 74
Setting detail: SPECIMEN
Discharge: HOME | End: 2024-10-01

## 2024-10-01 LAB
ALBUMIN SERPL-MCNC: 4.6 G/DL (ref 3.5–5.2)
ALBUMIN/GLOB SERPL: 2 {RATIO} (ref 1–2.5)
ALP SERPL-CCNC: 49 U/L (ref 40–129)
ALT SERPL-CCNC: 43 U/L (ref 10–50)
ANION GAP SERPL CALCULATED.3IONS-SCNC: 10 MMOL/L (ref 9–16)
AST SERPL-CCNC: 40 U/L (ref 10–50)
BILIRUB SERPL-MCNC: 0.7 MG/DL (ref 0–1.2)
BUN SERPL-MCNC: 10 MG/DL (ref 8–23)
CALCIUM SERPL-MCNC: 9.3 MG/DL (ref 8.6–10.4)
CHLORIDE SERPL-SCNC: 102 MMOL/L (ref 98–107)
CO2 SERPL-SCNC: 28 MMOL/L (ref 20–31)
CREAT SERPL-MCNC: 1 MG/DL (ref 0.7–1.2)
ERYTHROCYTE [DISTWIDTH] IN BLOOD BY AUTOMATED COUNT: 12.3 % (ref 11.8–14.4)
FOLATE SERPL-MCNC: 14.4 NG/ML (ref 4.8–24.2)
GFR, ESTIMATED: 79 ML/MIN/1.73M2
GLUCOSE SERPL-MCNC: 119 MG/DL (ref 74–99)
HCT VFR BLD AUTO: 44.2 % (ref 40.7–50.3)
HGB BLD-MCNC: 14.7 G/DL (ref 13–17)
MCH RBC QN AUTO: 34 PG (ref 25.2–33.5)
MCHC RBC AUTO-ENTMCNC: 33.3 G/DL (ref 28.4–34.8)
MCV RBC AUTO: 102.3 FL (ref 82.6–102.9)
NRBC BLD-RTO: 0 PER 100 WBC
PLATELET # BLD AUTO: ABNORMAL K/UL (ref 138–453)
PLATELET, FLUORESCENCE: ABNORMAL K/UL (ref 138–453)
POTASSIUM SERPL-SCNC: 5.1 MMOL/L (ref 3.7–5.3)
PROT SERPL-MCNC: 7.3 G/DL (ref 6.6–8.7)
RBC # BLD AUTO: 4.32 M/UL (ref 4.21–5.77)
SODIUM SERPL-SCNC: 140 MMOL/L (ref 136–145)
VIT B12 SERPL-MCNC: 620 PG/ML (ref 232–1245)
WBC OTHER # BLD: 6.1 K/UL (ref 3.5–11.3)

## 2024-10-03 LAB
ITYP INTERPRETATION: NORMAL
PATH REV: NORMAL

## 2024-10-24 ENCOUNTER — OFFICE VISIT (OUTPATIENT)
Age: 74
End: 2024-10-24
Payer: MEDICARE

## 2024-10-24 VITALS
HEART RATE: 79 BPM | SYSTOLIC BLOOD PRESSURE: 138 MMHG | HEIGHT: 71 IN | DIASTOLIC BLOOD PRESSURE: 82 MMHG | BODY MASS INDEX: 29.96 KG/M2 | WEIGHT: 214 LBS | RESPIRATION RATE: 16 BRPM

## 2024-10-24 DIAGNOSIS — R26.81 UNSTEADY GAIT: ICD-10-CM

## 2024-10-24 DIAGNOSIS — M21.371 BILATERAL FOOT-DROP: Primary | ICD-10-CM

## 2024-10-24 DIAGNOSIS — R29.898 HAND WEAKNESS: ICD-10-CM

## 2024-10-24 DIAGNOSIS — G62.9 PERIPHERAL POLYNEUROPATHY: ICD-10-CM

## 2024-10-24 DIAGNOSIS — M21.372 BILATERAL FOOT-DROP: Primary | ICD-10-CM

## 2024-10-24 PROCEDURE — G8427 DOCREV CUR MEDS BY ELIG CLIN: HCPCS | Performed by: NEUROLOGICAL SURGERY

## 2024-10-24 PROCEDURE — 1036F TOBACCO NON-USER: CPT | Performed by: NEUROLOGICAL SURGERY

## 2024-10-24 PROCEDURE — 1159F MED LIST DOCD IN RCRD: CPT | Performed by: NEUROLOGICAL SURGERY

## 2024-10-24 PROCEDURE — 3017F COLORECTAL CA SCREEN DOC REV: CPT | Performed by: NEUROLOGICAL SURGERY

## 2024-10-24 PROCEDURE — 99214 OFFICE O/P EST MOD 30 MIN: CPT | Performed by: NEUROLOGICAL SURGERY

## 2024-10-24 PROCEDURE — 1123F ACP DISCUSS/DSCN MKR DOCD: CPT | Performed by: NEUROLOGICAL SURGERY

## 2024-10-24 PROCEDURE — G8417 CALC BMI ABV UP PARAM F/U: HCPCS | Performed by: NEUROLOGICAL SURGERY

## 2024-10-24 PROCEDURE — G8484 FLU IMMUNIZE NO ADMIN: HCPCS | Performed by: NEUROLOGICAL SURGERY

## 2024-10-25 NOTE — PROGRESS NOTES
[facility], [facility] NEUROSCIENCE [place], [facility] NEUROSURGERY  [address], [address]  [place] [id number]  Dept: [phone]  Dept Fax: [phone]     Patient:  Hilton Candelario  YOB: 1950  Date: 10/24/24      Chief Complaint   Patient presents with    Follow-up     Lumbar pain/Bilat foot drop, patient states he can't get into the neurologist until after January 1st, patient wants to discuss scheduling surgery           HPI:     Mr. Candelario returns to the office today due to ongoing difficulty with weakness.  I had seen him in the office on September 5.  We reviewed the results of an MRI of the cervical spine at that time, which had been ordered to evaluate gradually progressive weakness.  That study did not show any compression of the cervical spinal cord or any abnormal signal within the canal.  He had an EMG in March of this year that was interpreted as being consistent with a predominantly motor, axonal polyneuropathy in the lower extremities.  After the MRI of the cervical spine showed no explanation for his weakness I felt it was most appropriate to refer him along to a neurologist for further evaluation.  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  He has an appointment scheduled, but not until early January.  He returned to my office today because he has felt that his weakness is continuing to worsen.  He describes easy fatigability.  He states that when he goes to the grocery store by the time he returns home he finds it very difficult to walk enough to get his groceries into the house.  He has not noticed any changes in his vision at all.  He is not having any diplopia or blurry vision.  He denies any sensory changes.          Physical Exam:      /82 (Site: Left Upper Arm, Position: Sitting, Cuff Size: Large Adult)   Pulse 79   Resp 16   Ht 1.803 m (5' 11\")   Wt 97.1 kg (214 lb)   BMI 29.85 kg/m²     He is

## 2024-10-30 ENCOUNTER — HOSPITAL ENCOUNTER (OUTPATIENT)
Age: 74
Setting detail: SPECIMEN
Discharge: HOME | End: 2024-10-30

## 2024-10-30 DIAGNOSIS — M21.371 BILATERAL FOOT-DROP: ICD-10-CM

## 2024-10-30 DIAGNOSIS — R26.81 UNSTEADY GAIT: ICD-10-CM

## 2024-10-30 DIAGNOSIS — R29.898 HAND WEAKNESS: ICD-10-CM

## 2024-10-30 DIAGNOSIS — M21.372 BILATERAL FOOT-DROP: ICD-10-CM

## 2024-10-30 LAB
ALBUMIN SERPL-MCNC: 4.7 G/DL (ref 3.5–5.2)
ALBUMIN/GLOB SERPL: 1.6 {RATIO} (ref 1–2.5)
ALP SERPL-CCNC: 49 U/L (ref 40–129)
ALT SERPL-CCNC: 43 U/L (ref 10–50)
ANION GAP SERPL CALCULATED.3IONS-SCNC: 9 MMOL/L (ref 9–16)
AST SERPL-CCNC: 40 U/L (ref 10–50)
BILIRUB SERPL-MCNC: 0.8 MG/DL (ref 0–1.2)
BUN SERPL-MCNC: 10 MG/DL (ref 8–23)
CALCIUM SERPL-MCNC: 9.5 MG/DL (ref 8.6–10.4)
CHLORIDE SERPL-SCNC: 103 MMOL/L (ref 98–107)
CO2 SERPL-SCNC: 29 MMOL/L (ref 20–31)
CREAT SERPL-MCNC: 0.9 MG/DL (ref 0.7–1.2)
ERYTHROCYTE [DISTWIDTH] IN BLOOD BY AUTOMATED COUNT: 12.4 % (ref 11.8–14.4)
GFR, ESTIMATED: >90 ML/MIN/1.73M2
GLUCOSE SERPL-MCNC: 126 MG/DL (ref 74–99)
HCT VFR BLD AUTO: 46.2 % (ref 40.7–50.3)
HGB BLD-MCNC: 15.2 G/DL (ref 13–17)
MCH RBC QN AUTO: 34 PG (ref 25.2–33.5)
MCHC RBC AUTO-ENTMCNC: 32.9 G/DL (ref 28.4–34.8)
MCV RBC AUTO: 103.4 FL (ref 82.6–102.9)
NRBC BLD-RTO: 0 PER 100 WBC
PLATELET # BLD AUTO: ABNORMAL K/UL (ref 138–453)
PLATELET, FLUORESCENCE: 151 K/UL (ref 138–453)
PLATELETS.RETICULATED NFR BLD AUTO: 11.2 % (ref 1.1–10.3)
POTASSIUM SERPL-SCNC: 4.8 MMOL/L (ref 3.7–5.3)
PROT SERPL-MCNC: 7.7 G/DL (ref 6.6–8.7)
RBC # BLD AUTO: 4.47 M/UL (ref 4.21–5.77)
SODIUM SERPL-SCNC: 141 MMOL/L (ref 136–145)
WBC OTHER # BLD: 5.7 K/UL (ref 3.5–11.3)

## 2024-11-01 ENCOUNTER — TELEPHONE (OUTPATIENT)
Age: 74
End: 2024-11-01

## 2024-11-01 LAB — ACHR BIND AB SER-SCNC: 0.4 NMOL/L (ref 0–0.4)

## 2024-11-01 NOTE — TELEPHONE ENCOUNTER
Patient called to see if you talked to Dr Montano to try to get him in sooner than January. When I called the office they said the doctor viewed the referral and said he needs to see the physician only.

## 2024-11-11 NOTE — TELEPHONE ENCOUNTER
Patient called TALIA 11/7 [phone] on surgery scheduler line asking if able to move up appt with Neurologist.     Nunu please advise, TY.

## 2025-04-04 ENCOUNTER — HOSPITAL ENCOUNTER (OUTPATIENT)
Age: 75
Setting detail: SPECIMEN
Discharge: HOME | End: 2025-04-04

## 2025-04-04 LAB
ALT SERPL-CCNC: 33 U/L (ref 10–50)
AST SERPL-CCNC: 25 U/L (ref 10–50)
CHOLEST SERPL-MCNC: 151 MG/DL (ref 0–199)
CHOLESTEROL/HDL RATIO: 3.1
HDLC SERPL-MCNC: 48 MG/DL
LDLC SERPL CALC-MCNC: 89 MG/DL (ref 0–100)
TRIGL SERPL-MCNC: 72 MG/DL
VLDLC SERPL CALC-MCNC: 14 MG/DL (ref 1–30)

## 2025-05-06 ENCOUNTER — HOSPITAL ENCOUNTER (OUTPATIENT)
Age: 75
Setting detail: SPECIMEN
Discharge: HOME | End: 2025-05-06

## 2025-05-06 LAB
ALBUMIN SERPL-MCNC: 4.1 G/DL (ref 3.5–5.2)
ALBUMIN/GLOB SERPL: 1.5 {RATIO} (ref 1–2.5)
ALP SERPL-CCNC: 45 U/L (ref 40–129)
ALT SERPL-CCNC: 27 U/L (ref 10–50)
ANION GAP SERPL CALCULATED.3IONS-SCNC: 8 MMOL/L (ref 9–16)
AST SERPL-CCNC: 24 U/L (ref 10–50)
BASOPHILS # BLD: 0.03 K/UL (ref 0–0.2)
BASOPHILS NFR BLD: 1 % (ref 0–2)
BILIRUB SERPL-MCNC: 0.9 MG/DL (ref 0–1.2)
BUN SERPL-MCNC: 14 MG/DL (ref 8–23)
CALCIUM SERPL-MCNC: 9.4 MG/DL (ref 8.6–10.4)
CHLORIDE SERPL-SCNC: 101 MMOL/L (ref 98–107)
CO2 SERPL-SCNC: 29 MMOL/L (ref 20–31)
CREAT SERPL-MCNC: 1.1 MG/DL (ref 0.7–1.2)
EOSINOPHIL # BLD: 0.1 K/UL (ref 0–0.44)
EOSINOPHILS RELATIVE PERCENT: 2 % (ref 1–4)
ERYTHROCYTE [DISTWIDTH] IN BLOOD BY AUTOMATED COUNT: 12.3 % (ref 11.8–14.4)
FOLATE SERPL-MCNC: 15.6 NG/ML (ref 4.8–24.2)
GFR, ESTIMATED: 71 ML/MIN/1.73M2
GLUCOSE SERPL-MCNC: 96 MG/DL (ref 74–99)
HCT VFR BLD AUTO: 42 % (ref 40.7–50.3)
HGB BLD-MCNC: 13.7 G/DL (ref 13–17)
IMM GRANULOCYTES # BLD AUTO: <0.03 K/UL (ref 0–0.3)
IMM GRANULOCYTES NFR BLD: 0 %
LYMPHOCYTES NFR BLD: 1.64 K/UL (ref 1.1–3.7)
LYMPHOCYTES RELATIVE PERCENT: 33 % (ref 24–43)
MCH RBC QN AUTO: 33 PG (ref 25.2–33.5)
MCHC RBC AUTO-ENTMCNC: 32.6 G/DL (ref 28.4–34.8)
MCV RBC AUTO: 101.2 FL (ref 82.6–102.9)
MONOCYTES NFR BLD: 0.6 K/UL (ref 0.1–1.2)
MONOCYTES NFR BLD: 12 % (ref 3–12)
NEUTROPHILS NFR BLD: 52 % (ref 36–65)
NEUTS SEG NFR BLD: 2.59 K/UL (ref 1.5–8.1)
NRBC BLD-RTO: 0 PER 100 WBC
PLATELET # BLD AUTO: ABNORMAL K/UL (ref 138–453)
PLATELET, FLUORESCENCE: ABNORMAL K/UL (ref 138–453)
POTASSIUM SERPL-SCNC: 4.8 MMOL/L (ref 3.7–5.3)
PROT SERPL-MCNC: 6.8 G/DL (ref 6.6–8.7)
RBC # BLD AUTO: 4.15 M/UL (ref 4.21–5.77)
SODIUM SERPL-SCNC: 138 MMOL/L (ref 136–145)
VIT B12 SERPL-MCNC: 1264 PG/ML (ref 232–1245)
WBC OTHER # BLD: 5 K/UL (ref 3.5–11.3)

## 2025-05-07 LAB
ITYP INTERPRETATION: NORMAL
PATH REV: NORMAL